# Patient Record
Sex: MALE | Race: WHITE | URBAN - METROPOLITAN AREA
[De-identification: names, ages, dates, MRNs, and addresses within clinical notes are randomized per-mention and may not be internally consistent; named-entity substitution may affect disease eponyms.]

---

## 2017-01-02 DIAGNOSIS — G89.29 CHRONIC NECK PAIN: Primary | ICD-10-CM

## 2017-01-02 DIAGNOSIS — F51.04 CHRONIC INSOMNIA: ICD-10-CM

## 2017-01-02 DIAGNOSIS — M54.2 CHRONIC NECK PAIN: Primary | ICD-10-CM

## 2017-01-02 NOTE — TELEPHONE ENCOUNTER
zolpidem (AMBIEN) 5 MG tablet      Last Written Prescription Date:  10/17/16  Last Fill Quantity: 90,   # refills: 0  Last Office Visit with [facility] or  Health prescribing provider: 03/23/16  Future Office visit:       Routing refill request to provider for review/approval because:  Drug not on the [facility] or  Health refill protocol or controlled substance.      gabapentin (NEURONTIN) 300 MG capsule      Last Written Prescription Date:  06/27/16  Last Office Visit with [facility] or [facility] prescribing provider: 03/23/16  Future Office visit:       Routing refill request to provider for review/approval because:  Drug not on the [facility] or  Health refill protocol or controlled substance  Medication is reported/historical

## 2017-01-03 ENCOUNTER — TELEPHONE (OUTPATIENT)
Dept: PEDIATRICS | Facility: CLINIC | Age: 67
End: 2017-01-03

## 2017-01-03 RX ORDER — ZOLPIDEM TARTRATE 5 MG/1
TABLET ORAL
Qty: 30 TABLET | Refills: 0 | Status: SHIPPED | OUTPATIENT
Start: 2017-01-03 | End: 2017-02-01

## 2017-01-03 RX ORDER — GABAPENTIN 300 MG/1
CAPSULE ORAL
Qty: 60 CAPSULE | Refills: 0 | Status: SHIPPED | OUTPATIENT
Start: 2017-01-03 | End: 2017-02-01
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
## 2017-01-03 NOTE — TELEPHONE ENCOUNTER
[facility] Call Center    Phone Message    Name of Caller: Madan    Phone Number: Home number on file [phone] (home)    Best time to return call: Any    May a detailed message be left on voicemail: yes    Relation to patient: Self    Reason for Call: Other: Patient is calling requesting an order be placed into his chart for him to have an injection in his neck to be done on an imaging department. Please call back to notify.      Action Taken: Message routed to:  Primary Care p [id number]

## 2017-01-03 NOTE — TELEPHONE ENCOUNTER
Patient is overdue for check up. I approved 30 days. Please assist patient schedule diabetes follow up and chronic disease review.     Fax ambien Rx to pt's pharmacy (if pt can wait until Thursday I will sign when I return)

## 2017-01-13 ENCOUNTER — OFFICE VISIT (OUTPATIENT)
Dept: PEDIATRICS | Facility: CLINIC | Age: 67
End: 2017-01-13
Payer: COMMERCIAL

## 2017-01-13 VITALS
DIASTOLIC BLOOD PRESSURE: 72 MMHG | SYSTOLIC BLOOD PRESSURE: 108 MMHG | TEMPERATURE: 97.5 F | OXYGEN SATURATION: 99 % | WEIGHT: 232 LBS | BODY MASS INDEX: 31.46 KG/M2 | HEART RATE: 82 BPM

## 2017-01-13 DIAGNOSIS — M54.12 CERVICAL RADICULOPATHY: Primary | ICD-10-CM

## 2017-01-13 DIAGNOSIS — Z11.59 NEED FOR HEPATITIS C SCREENING TEST: ICD-10-CM

## 2017-01-13 DIAGNOSIS — R73.09 ELEVATED GLUCOSE: ICD-10-CM

## 2017-01-13 DIAGNOSIS — Z11.59 NEED FOR HEPATITIS C SCREENING TEST: Primary | ICD-10-CM

## 2017-01-13 PROCEDURE — 99213 OFFICE O/P EST LOW 20 MIN: CPT | Performed by: INTERNAL MEDICINE
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
## 2017-01-13 NOTE — LETTER
January 29, 2018      Madan Gatica  [address]   [place] [address]              Dear Madan Gatica,      In order to ensure that we are providing the best quality care, we would like to remind you that you are due for fasting labs and a physical. Please let us know if you have any questions and we would be happy to help.     Thank you for trusting us with your care.    Sincerely,       [facility]  [phone]

## 2017-01-13 NOTE — MR AVS SNAPSHOT
After Visit Summary   2017    Madan Gatica    MRN: [id number]           Patient Information     Date Of Birth          1950        Visit Information        Provider Department      2017 2:40 PM Sydnee Mitchell MD [facility]        Today's Diagnoses     Cervical radiculopathy    -  1       Care Instructions    You can call [phone] to schedule for MRI.     [facility] Imaging:   [phone]  Fax: [phone]    [facility] Imaging:   Scheduling Line: [phone]  Clinic Phone: [phone]  Clinic Fax: [phone]        Follow-ups after your visit        Additional Services     ORTHO  REFERRAL       [facility]  Phone: [phone]          The  Representative will assist you in the coordination of your Orthopedic and Musculoskeletal Care as prescribed by your physician.    The  Representative will call you within 1 business day to help schedule your appointment, or you may contact the  Representative at:    All areas ~ [phone]     Type of Referral : Spine: Cervical / Thoracic: Cervical / Thoracic Spine Surgeon        Timeframe requested: Routine    Coverage of these services is subject to the terms and limitations of your health insurance plan.  Please call member services at your health plan with any benefit or coverage questions.      If X-rays, CT or MRI's have been performed, please contact the facility where they were done to arrange for , prior to your scheduled appointment.  Please bring this referral request to your appointment and present it to your specialist.                  Future tests that were ordered for you today     Open Future Orders        Priority Expected Expires Ordered    MR Cervical Spine w/o Contrast Routine  2018            Who to contact     If you have questions or need follow up information about today's clinic visit or your schedule please contact [facility] directly at [phone].  Normal or non-critical lab and imaging results will be communicated to you by Swishhart, letter or phone within 4 business days after the clinic has received the results. If you do not hear from us within 7 days, please contact the clinic through Swishhart or phone. If you have a critical or abnormal lab result, we will notify you by phone as soon as possible.  Submit refill requests through Lolay or call your pharmacy and they will forward the refill request to us. Please allow 3 business days for your refill to be completed.          Additional Information About Your Visit        Lolay Information     Lolay gives you secure access to your electronic health record. If you see a primary care provider, you can also send messages to your care team and make appointments. If you have questions, please call your primary care clinic.  If you do not have a primary care provider, please call [phone] and they will assist you.      Lolay is an electronic gateway that provides easy, online access to your medical records. With Lolay, you can request a clinic appointment, read your test results, renew a prescription or communicate with your care team.     To access your existing account, please contact your [facility] or call [phone] for assistance.        Care EveryWhere ID     This is your Care EveryWhere ID. This could be used by other organizations to access your [place] medical records  [id number]        Your Vitals Were     Pulse Temperature Pulse Oximetry             82 97.5  F (36.4  C) (Temporal) 99%          Blood Pressure from Last 3 Encounters:   01/13/17 108/72   05/18/16 120/83   03/23/16 121/81    Weight from Last 3 Encounters:   01/13/17 232 lb (105.235 kg)   05/18/16 231 lb (104.781 kg)   03/23/16 236 lb 8 oz (107.276 kg)              We Performed the Following     ORTHO  REFERRAL        Primary Care  Provider    [facility]       No address on file        Thank you!     Thank you for choosing [facility]  for your care. Our goal is always to provide you with excellent care. Hearing back from our patients is one way we can continue to improve our services. Please take a few minutes to complete the written survey that you may receive in the mail after your visit with us. Thank you!             Your Updated Medication List - Protect others around you: Learn how to safely use, store and throw away your medicines at www.disposemymeds.org.          This list is accurate as of: 1/13/17  2:52 PM.  Always use your most recent med list.                   Brand Name Dispense Instructions for use    ASPIRIN PO      Take 81 mg by mouth       blood glucose lancets standard    no brand specified     two times a day. Use as directed. Pharmacy dispense brand based on insurance.       blood glucose monitoring test strip    no brand specified     USE ONE STRIP TO CHECK GLUCOSE TWICE DAILY AS DIRECTED       cyclobenzaprine 10 MG tablet    FLEXERIL         gabapentin 300 MG capsule    NEURONTIN    60 capsule    TAKE TWO CAPSULES BY MOUTH AT BEDTIME       sildenafil 100 MG cap/tab    VIAGRA    6 tablet    Take 0.5-1 tablets ( mg) by mouth daily as needed for erectile dysfunction Take 30 min to 4 hours before intercourse.  Never use with nitroglycerin, terazosin or doxazosin.       zolpidem 5 MG tablet    AMBIEN    30 tablet    TAKE ONE TABLET BY MOUTH AT BEDTIME AS NEEDED

## 2017-01-13 NOTE — PATIENT INSTRUCTIONS
You can call [phone] to schedule for MRI.     Schedule for annual wellness and fasting lab appointment in February.         [facility] Imaging:   [phone]  Fax: [phone]    [facility] Imaging:   Scheduling Line: [phone]  Clinic Phone: [phone]  Clinic Fax: [phone]

## 2017-01-20 ENCOUNTER — RADIANT APPOINTMENT (OUTPATIENT)
Dept: MRI IMAGING | Facility: CLINIC | Age: 67
End: 2017-01-20
Attending: INTERNAL MEDICINE
Payer: COMMERCIAL

## 2017-01-20 DIAGNOSIS — M54.12 CERVICAL RADICULOPATHY: ICD-10-CM

## 2017-01-20 PROCEDURE — 72141 MRI NECK SPINE W/O DYE: CPT | Performed by: RADIOLOGY

## 2017-01-23 ENCOUNTER — TELEPHONE (OUTPATIENT)
Dept: PEDIATRICS | Facility: CLINIC | Age: 67
End: 2017-01-23

## 2017-01-23 NOTE — TELEPHONE ENCOUNTER
[facility] Call Center    Phone Message    Name of Caller: Madan    Phone Number: Cell number on file:    Telephone Information:   Mobile [phone]       Best time to return call: ANY    May a detailed message be left on voicemail: yes    Relation to patient: Self    Reason for Call: Requesting Results   Name/type of test: MRI  Date of test: 01/20/17  Was test done at a location other than [facility] (Please fill in the location if not [facility])?: No      Action Taken: Message routed to:  Primary Care p [id number]

## 2017-01-24 NOTE — TELEPHONE ENCOUNTER
Let patient know that MRI showed the following. Moderate to severe narrowing of the nerve root exit area at C4-5 and C5-6. These may be contributing to his symptoms. I recommend he sees a spine surgeon to evaluate. I gave him a referral to [facility] at his clinic visit. Pt should request a copy of the MRI on CD and bring it to the appointment.     Impression: Multilevel cervical spondylosis and spondylolisthesis.  Moderate to advanced bilateral neural foraminal stenosis at C4-C5 and  C5-C6. Mild central canal stenosis C2-C7. No cord signal abnormality.

## 2017-01-24 NOTE — TELEPHONE ENCOUNTER
Call back placed to patient to review Dr. Mitchell's recommendations related to MRI results, as noted below.  This writer reviewed with patient.  Patient verbalized understanding and will make an appointment with [facility] for further evaluation.  Encouraged patient to call with any questions or concerns if he runs into issues with the referral process to [facility].      Patient verbalized understanding and agreeable to plan of care.      Sydnee Mitchell MD at 1/23/2017 10:18 PM      Status: Signed         Expand All Collapse All    Let patient know that MRI showed the following. Moderate to severe narrowing of the nerve root exit area at C4-5 and C5-6. These may be contributing to his symptoms. I recommend he sees a spine surgeon to evaluate. I gave him a referral to [facility] at his clinic visit. Pt should request a copy of the MRI on CD and bring it to the appointment.     Impression: Multilevel cervical spondylosis and spondylolisthesis.  Moderate to advanced bilateral neural foraminal stenosis at C4-C5 and  C5-C6. Mild central canal stenosis C2-C7. No cord signal abnormality.

## 2017-01-25 ENCOUNTER — TELEPHONE (OUTPATIENT)
Dept: PEDIATRICS | Facility: CLINIC | Age: 67
End: 2017-01-25

## 2017-01-25 NOTE — TELEPHONE ENCOUNTER
[facility] Call Center    Phone Message    Name of Caller: Madan    Phone Number: Cell number on file:    Telephone Information:   Mobile [phone]       Best time to return call: any    May a detailed message be left on voicemail: yes    Relation to patient: Self    Reason for Call: Order(s): Other:   What is being requested: Surgery orders to be faxed to [facility]  Reason for requested: [facility] requesting surgery orders be faxed to them at [phone]  Date needed: ASAP  Provider name: Dr. Mitchell      Action Taken: Message routed to:  Primary Care p [phone]

## 2017-01-25 NOTE — TELEPHONE ENCOUNTER
Spoke to patient, he is requesting the ortho referral be sent to [facility].     Referral faxed to [phone].    Dedra Childress RN, AE-C

## 2017-02-01 DIAGNOSIS — G89.29 CHRONIC NECK PAIN: ICD-10-CM

## 2017-02-01 DIAGNOSIS — F51.04 CHRONIC INSOMNIA: Primary | ICD-10-CM

## 2017-02-01 DIAGNOSIS — M54.2 CHRONIC NECK PAIN: ICD-10-CM

## 2017-02-02 RX ORDER — ZOLPIDEM TARTRATE 5 MG/1
TABLET ORAL
Qty: 90 TABLET | Refills: 0 | Status: SHIPPED | OUTPATIENT
Start: 2017-02-02 | End: 2017-05-08

## 2017-02-02 RX ORDER — GABAPENTIN 300 MG/1
CAPSULE ORAL
Qty: 60 CAPSULE | Refills: 0 | Status: SHIPPED | OUTPATIENT
Start: 2017-02-02 | End: 2017-02-13

## 2017-02-02 NOTE — TELEPHONE ENCOUNTER
gabapentin (NEURONTIN) 300 MG capsule      Last Written Prescription Date:  01/03/17  Last Fill Quantity: 60,   # refills: 0  Last Office Visit with [facility] or  Health prescribing provider: 01/13/17.  Future Office visit:       Routing refill request to provider for review/approval because:  Drug not on the [facility], [facility] or [facility] refill protocol or controlled substance.    Per pharmacy fax notation, patient is requesting a 90-day supply.      zolpidem (AMBIEN) 5 MG tablet      Last Written Prescription Date:  01/03/17  Last Fill Quantity: 30,   # refills: 0  Last Office Visit with [facility], [facility] or [facility] prescribing provider: 01/13/17.  Future Office visit:       Routing refill request to provider for review/approval because:  Drug not on the [facility], [facility] or [facility] refill protocol or controlled substance

## 2017-02-13 DIAGNOSIS — G89.29 CHRONIC NECK PAIN: ICD-10-CM

## 2017-02-13 DIAGNOSIS — M54.2 CHRONIC NECK PAIN: ICD-10-CM

## 2017-02-13 RX ORDER — GABAPENTIN 300 MG/1
300 CAPSULE ORAL 3 TIMES DAILY
Qty: 90 CAPSULE | Refills: 1 | Status: SHIPPED | OUTPATIENT
Start: 2017-02-13 | End: 2017-05-12

## 2017-02-13 NOTE — TELEPHONE ENCOUNTER
gabapentin (NEURONTIN) 300 MG capsule      Last Written Prescription Date: 2/2/17 - Per pharmacy note patient requesting 90 day supply  Last Quantity: 60, # refills: 0  Last Office Visit with [facility] or [facility] prescribing provider: 1/13/17       Creatinine   Date Value Ref Range Status   12/03/2015 1.09 0.66 - 1.25 mg/dL Final     Lab Results   Component Value Date    AST 27 12/03/2015     Lab Results   Component Value Date    ALT 38 12/03/2015     BP Readings from Last 3 Encounters:   01/13/17 108/72   05/18/16 120/83   03/23/16 121/81

## 2017-05-08 DIAGNOSIS — F51.04 CHRONIC INSOMNIA: ICD-10-CM

## 2017-05-08 RX ORDER — ZOLPIDEM TARTRATE 5 MG/1
TABLET ORAL
Qty: 90 TABLET | Refills: 1 | Status: SHIPPED | OUTPATIENT
Start: 2017-05-08 | End: 2017-12-15

## 2017-05-08 NOTE — TELEPHONE ENCOUNTER
[facility] Call Center    Phone Message    Name of Caller: Madan    Phone Number: Home number on file [phone] (home)    Best time to return call: Any    May a detailed message be left on voicemail: yes    Relation to patient: Self    Reason for Call: Medication Refill Request    Has the patient contacted the pharmacy for the refill? Yes   Name of medication being requested: zolpidem (AMBIEN) 5 MG tablet [64100] (Order [id number])     Provider who prescribed the medication: Dr. Mitchell  Pharmacy:   Date medication is needed: ASAP         Action Taken: Message routed to:  Primary Care p [phone]

## 2017-05-08 NOTE — TELEPHONE ENCOUNTER
Routing refill request to provider for review/approval because:  Drug not on the [facility] refill protocol       Routing high priority as patient is out of medication.          zolpidem (AMBIEN) 5 MG tablet 90 tablet 0 2/2/2017  No      Sig: TAKE ONE TABLET BY MOUTH AT BEDTIME AS NEEDED     Tatiana Tee RN, [facility] [[email or web address]]

## 2017-05-09 NOTE — TELEPHONE ENCOUNTER
Patient advised script for ambien faxed to [facility].    Jania Winter CMA

## 2017-05-12 ENCOUNTER — OFFICE VISIT (OUTPATIENT)
Dept: PEDIATRICS | Facility: CLINIC | Age: 67
End: 2017-05-12
Payer: COMMERCIAL

## 2017-05-12 VITALS
OXYGEN SATURATION: 99 % | DIASTOLIC BLOOD PRESSURE: 80 MMHG | WEIGHT: 235 LBS | HEART RATE: 89 BPM | BODY MASS INDEX: 31.87 KG/M2 | TEMPERATURE: 97.9 F | SYSTOLIC BLOOD PRESSURE: 124 MMHG

## 2017-05-12 DIAGNOSIS — K80.20 CALCULUS OF GALLBLADDER WITHOUT CHOLECYSTITIS WITHOUT OBSTRUCTION: Primary | ICD-10-CM

## 2017-05-12 DIAGNOSIS — K70.0 FATTY LIVER, ALCOHOLIC: ICD-10-CM

## 2017-05-12 PROCEDURE — 99213 OFFICE O/P EST LOW 20 MIN: CPT | Performed by: INTERNAL MEDICINE

## 2017-05-12 NOTE — MR AVS SNAPSHOT
After Visit Summary   5/12/2017    Madan Gatica    MRN: [id number]           Patient Information     Date Of Birth          1950        Visit Information        Provider Department      5/12/2017 1:40 PM Sydnee Mitchell MD [facility]        Today's Diagnoses     Calculus of gallbladder without cholecystitis without obstruction    -  1    Fatty liver, alcoholic          Care Instructions    Make appointment(s) for:   -- see general surgeon to evaluate for gallbladder surgery.             Follow-ups after your visit        Additional Services     GENERAL SURG ADULT REFERRAL       Your provider has referred you to: [facility]: [facility] [phone]   [email or web address]    Please be aware that coverage of these services is subject to the terms and limitations of your health insurance plan.  Call member services at your health plan with any benefit or coverage questions.      Please bring the following with you to your appointment:    (1) Any X-Rays, CTs or MRIs which have been performed.  Contact the facility where they were done to arrange for  prior to your scheduled appointment.   (2) List of current medications   (3) This referral request   (4) Any documents/labs given to you for this referral                  Who to contact     If you have questions or need follow up information about today's clinic visit or your schedule please contact [facility] directly at [phone].  Normal or non-critical lab and imaging results will be communicated to you by MyChart, letter or phone within 4 business days after the clinic has received the results. If you do not hear from us within 7 days, please contact the clinic through MyChart or phone. If you have a critical or abnormal lab result, we will notify you by phone as soon as possible.  Submit refill requests through YaKlasst or  call your pharmacy and they will forward the refill request to us. Please allow 3 business days for your refill to be completed.          Additional Information About Your Visit        [employer] Information     2Checkout gives you secure access to your electronic health record. If you see a primary care provider, you can also send messages to your care team and make appointments. If you have questions, please call your primary care clinic.  If you do not have a primary care provider, please call [phone] and they will assist you.      2Checkout is an electronic gateway that provides easy, online access to your medical records. With 2Checkout, you can request a clinic appointment, read your test results, renew a prescription or communicate with your care team.     To access your existing account, please contact your [facility] or call [phone] for assistance.        Care EveryWhere ID     This is your Care EveryWhere ID. This could be used by other organizations to access your [place] medical records  [id number]        Your Vitals Were     Pulse Temperature Pulse Oximetry BMI (Body Mass Index)          89 97.9  F (36.6  C) (Temporal) 99% 31.87 kg/m2         Blood Pressure from Last 3 Encounters:   05/12/17 124/80   01/13/17 108/72   05/18/16 120/83    Weight from Last 3 Encounters:   05/12/17 235 lb (106.6 kg)   01/13/17 232 lb (105.2 kg)   05/18/16 231 lb (104.8 kg)              We Performed the Following     GENERAL SURG ADULT REFERRAL        Primary Care Provider Office Phone # Fax #    Sydnee Mitchell [phone]       [facility] [address]  [place] [address]        Thank you!     Thank you for choosing [facility]  for your care. Our goal is always to provide you with excellent care. Hearing back from our patients is one way we can continue to improve our services. Please take a few minutes to complete the written survey that you  may receive in the mail after your visit with us. Thank you!             Your Updated Medication List - Protect others around you: Learn how to safely use, store and throw away your medicines at www.disposemymeds.org.          This list is accurate as of: 5/12/17  2:16 PM.  Always use your most recent med list.                   Brand Name Dispense Instructions for use    ASPIRIN PO      Take 81 mg by mouth       blood glucose lancets standard    no brand specified     two times a day. Use as directed. Pharmacy dispense brand based on insurance.       blood glucose monitoring test strip    no brand specified     USE ONE STRIP TO CHECK GLUCOSE TWICE DAILY AS DIRECTED       cyclobenzaprine 10 MG tablet    FLEXERIL         sildenafil 100 MG cap/tab    VIAGRA    6 tablet    Take 0.5-1 tablets ( mg) by mouth daily as needed for erectile dysfunction Take 30 min to 4 hours before intercourse.  Never use with nitroglycerin, terazosin or doxazosin.       zolpidem 5 MG tablet    AMBIEN    90 tablet    TAKE ONE TABLET BY MOUTH ONCE DAILY AT BEDTIME AS NEEDED

## 2017-05-12 NOTE — NURSING NOTE
Chief Complaint   Patient presents with     Hospital F/U       Initial /80 (Cuff Size: Adult Large)  Pulse 89  Temp 97.9  F (36.6  C) (Temporal)  Wt 235 lb (106.6 kg)  SpO2 99%  BMI 31.87 kg/m2 Estimated body mass index is 31.87 kg/(m^2) as calculated from the following:    Height as of 5/18/16: 6' (1.829 m).    Weight as of this encounter: 235 lb (106.6 kg).  Medication Reconciliation: complete

## 2017-05-12 NOTE — PROGRESS NOTES
SUBJECTIVE:                                                    Madan Gatica is a 66 year old male who presents to clinic today for the following health issues:      ED/UC Followup:    Facility:  [facility]  Date of visit: 5-9-17  Reason for visit: Gallstones  Current Status: improved, but still pain under breast bone       Patient presented to [facility] with acute right-sided lower chest pain and left upper quadrant abdominal pain. He had chest x-ray and ultrasound of the abdomen. Chest x-ray was negative for pneumonia, pleural effusion or pneumothorax. Abdominal ultrasound showed fatty liver disease and no stones. No bile duct dilatation. The remaining of the ultrasound was normal. Patient reports the pain is subsided significantly. His back to work now. However he continues to have mild pain under the breast bone. Patient drinks alcohol 2-3 beers on a daily basis. I discussed with patient that this may be contributing to the fatty liver disease.         Problem list, Medication list, Allergies, and Medical/Social/Surgical histories reviewed in EPIC and updated as appropriate.    OBJECTIVE:                                                    /80 (Cuff Size: Adult Large)  Pulse 89  Temp 97.9  F (36.6  C) (Temporal)  Wt 235 lb (106.6 kg)  SpO2 99%  BMI 31.87 kg/m2    GEN: healthy, alert and no distress  Abd: soft, normal active bowel sounds, RUQ mildly tender, non distended. No mass or organomegaly.           Diagnostic test results:  No results found for this or any previous visit (from the past 24 hour(s)).       ASSESSMENT/PLAN:                                                      66 year old male with the following diagnoses and treatment plan:      ICD-10-CM    1. Calculus of gallbladder without cholecystitis without obstruction K80.20 GENERAL SURG ADULT REFERRAL   2. Fatty liver, alcoholic K70.0        -- We'll refer to general surgeon for consideration of cholecystectomy, low-fat diet.  Also discuss cutting down on alcohol use.    Sydnee Mitchell MD-PhD  [facility]    (Note: Chart documentation was done in part with Dragon Voice Recognition software. Although reviewed after completion, some word and grammatical errors may remain.)

## 2017-05-15 ENCOUNTER — OFFICE VISIT (OUTPATIENT)
Dept: SURGERY | Facility: CLINIC | Age: 67
End: 2017-05-15
Attending: INTERNAL MEDICINE
Payer: COMMERCIAL

## 2017-05-15 VITALS
BODY MASS INDEX: 31.97 KG/M2 | WEIGHT: 236 LBS | SYSTOLIC BLOOD PRESSURE: 135 MMHG | HEART RATE: 95 BPM | OXYGEN SATURATION: 96 % | HEIGHT: 72 IN | DIASTOLIC BLOOD PRESSURE: 87 MMHG

## 2017-05-15 DIAGNOSIS — R10.11 RUQ PAIN: Primary | ICD-10-CM

## 2017-05-15 PROCEDURE — 99204 OFFICE O/P NEW MOD 45 MIN: CPT | Performed by: SURGERY

## 2017-05-15 NOTE — PROGRESS NOTES
"Chief Complaint: RUQ pain    History of Present Illness:   66 year old male sent in consultation by Sydnee Mitchell MD for evaluation of RUQ pain. He first noted some acute RUQ pain on 5/9/17. He had been recovering from a URTI and noted right lower chest wall pain following movement of his torso. He then noted constant pain in that region which he describes as feeling like it is under his \"breast bone\" and feels like a dull pressure without any radiation. He denies any radiation of the symptoms. He notes exacerbation with activity and movement and improvement with rest. He denies any association with food intake and denies any associated nausea or vomiting. He now notes that the pain has significantly improved, but since occurs 1-2 times/hour, lasting about 2 minutes.       No past medical history on file.  Past Surgical History:   Procedure Laterality Date     LASIK BILATERAL  2004     REMOVE HARDWARE ARTHROPLASTY KNEE, IRRIG & ROMEO, PLACE ANTIBIOTIC CEMENT SPACER, COMBINED Right 2008     Current Outpatient Prescriptions   Medication     zolpidem (AMBIEN) 5 MG tablet     sildenafil (VIAGRA) 100 MG tablet     blood glucose monitoring (NO BRAND SPECIFIED) test strip     blood glucose (NO BRAND SPECIFIED) lancets standard     cyclobenzaprine (FLEXERIL) 10 MG tablet     ASPIRIN PO     No current facility-administered medications for this visit.         Allergies   Allergen Reactions     Penicillins      Social History     Social History     Marital status:      Spouse name: N/A     Number of children: N/A     Years of education: N/A     Occupational History     Not on file.     Social History Main Topics     Smoking status: Former Smoker     Packs/day: 1.00     Years: 4.00     Types: Cigarettes     Smokeless tobacco: Never Used     Alcohol use 12.6 oz/week     21 Standard drinks or equivalent per week      Comment: 3 beer a day     Drug use: No     Sexual activity: Yes     Partners: Female     Other Topics Concern " "    Not on file     Social History Narrative     Family History   Problem Relation Age of Onset     HEART DISEASE Brother      bicuspid aortic valve     Liver Disease Brother      hemachromatosis           Review of Systems:  No chest pain, dyspnea,  fevers or night sweats. Intentional weight loss of ~ 30 lbs over the last 30 years   All other systems questioned and negative.     Exam:  Vital signs for exam: /87  Pulse 95  Ht 1.816 m (5' 11.5\")  Wt 107 kg (236 lb)  SpO2 96%  BMI 32.46 kg/m2  Constitutional: healthy, alert and no distress.  Head: Normocephalic. No masses, lesions, tenderness or abnormalities.  ENT: ENT exam normal, no neck nodes or sinus tenderness.  Cardiovascular: Normal S1, S2, no murmurs  Respiratory: Clear to auscultation.   Gastrointestinal:  Abdomen soft,No masses, organomegaly. Mild tenderness just below and along the costal margin on the right side  : Deferred  Musculoskeletal: extremities normal- no gross deformities noted and normal muscle tone  Skin: no jaundice, rashes or petechiae.   Neurologic: Gait normal.  Psychiatric: Mentation appears normal and affect normal.    Laboratory Studies:  Normal CBC, BMP and LFTs on 5/9/17    ECG: new right bundle branch block      Radiology:   US 5/9/17: There are gallstones seen layering in the dependent portion of the gallbladder. No associated gallbladder wall thickening. No biliary dilatation. The common duct measures 3 mm. The liver demonstrates some increased echogenicity probably reflecting fatty infiltration. Pancreas, spleen and kidneys appear unremarkable.    Chest x-ray:   The lungs are clear bilaterally.  The heart and mediastinal contours are normal.  No pleural effusion. There is no pneumothorax. Degenerative changes in the thoracic spine.    IMPRESSION AND PLAN:  RUQ with cholelithiasis on US. His symptoms do not sound classic for symptomatic cholelithiasis, and sound more musculoskeletal. I recommend a trial of anti " inflammatory medications. He will call me if the symptoms do not resolve, and we will consider a CCK HIDA.

## 2017-05-15 NOTE — MR AVS SNAPSHOT
After Visit Summary   5/15/2017    Madan Gatica    MRN: [id number]           Patient Information     Date Of Birth          1950        Visit Information        Provider Department      5/15/2017 11:30 AM Karey Schilling MD [facility]        Today's Diagnoses     RUQ pain    -  1       Follow-ups after your visit        Who to contact     If you have questions or need follow up information about today's clinic visit or your schedule please contact [facility] directly at [phone].  Normal or non-critical lab and imaging results will be communicated to you by Wuiperhart, letter or phone within 4 business days after the clinic has received the results. If you do not hear from us within 7 days, please contact the clinic through Wuiperhart or phone. If you have a critical or abnormal lab result, we will notify you by phone as soon as possible.  Submit refill requests through GlobalLogic or call your pharmacy and they will forward the refill request to us. Please allow 3 business days for your refill to be completed.          Additional Information About Your Visit        MyChart Information     GlobalLogic gives you secure access to your electronic health record. If you see a primary care provider, you can also send messages to your care team and make appointments. If you have questions, please call your primary care clinic.  If you do not have a primary care provider, please call [phone] and they will assist you.      GlobalLogic is an electronic gateway that provides easy, online access to your medical records. With GlobalLogic, you can request a clinic appointment, read your test results, renew a prescription or communicate with your care team.     To access your existing account, please contact your [facility] or call [phone] for assistance.        Care EveryWhere ID     This is your Care EveryWhere ID. This could be used by other  "organizations to access your [facility] medical records  [id number]        Your Vitals Were     Pulse Height Pulse Oximetry BMI (Body Mass Index)          95 1.816 m (5' 11.5\") 96% 32.46 kg/m2         Blood Pressure from Last 3 Encounters:   05/15/17 135/87   05/12/17 124/80   01/13/17 108/72    Weight from Last 3 Encounters:   05/15/17 107 kg (236 lb)   05/12/17 106.6 kg (235 lb)   01/13/17 105.2 kg (232 lb)              Today, you had the following     No orders found for display       Primary Care Provider Office Phone # Fax #    Sydnee Mitchell [phone]       [facility] [address] [facility] [address]        Thank you!     Thank you for choosing [facility]  for your care. Our goal is always to provide you with excellent care. Hearing back from our patients is one way we can continue to improve our services. Please take a few minutes to complete the written survey that you may receive in the mail after your visit with us. Thank you!             Your Updated Medication List - Protect others around you: Learn how to safely use, store and throw away your medicines at www.disposemymeds.org.          This list is accurate as of: 5/15/17 12:09 PM.  Always use your most recent med list.                   Brand Name Dispense Instructions for use    ASPIRIN PO      Take 81 mg by mouth       cyclobenzaprine 10 MG tablet    FLEXERIL         zolpidem 5 MG tablet    AMBIEN    90 tablet    TAKE ONE TABLET BY MOUTH ONCE DAILY AT BEDTIME AS NEEDED         "

## 2017-06-17 ENCOUNTER — TELEPHONE (OUTPATIENT)
Dept: PEDIATRICS | Facility: CLINIC | Age: 67
End: 2017-06-17

## 2017-12-15 DIAGNOSIS — F51.04 CHRONIC INSOMNIA: ICD-10-CM

## 2017-12-15 RX ORDER — ZOLPIDEM TARTRATE 5 MG/1
TABLET ORAL
Qty: 90 TABLET | Refills: 1 | Status: SHIPPED | OUTPATIENT
Start: 2017-12-15 | End: 2018-06-23

## 2017-12-15 NOTE — TELEPHONE ENCOUNTER
Routing refill request to provider for review/approval because:  Drug not on the [facility] refill protocol    Disp Refills Start End ANKIT      zolpidem (AMBIEN) 5 MG tablet 90 tablet 1 5/8/2017  No     Sig: TAKE ONE TABLET BY MOUTH ONCE DAILY AT BEDTIME AS NEEDED     Last office visit:  5/12/17          Faith Li RN,   [facility], [facility]

## 2018-03-29 ENCOUNTER — DOCUMENTATION ONLY (OUTPATIENT)
Dept: LAB | Facility: CLINIC | Age: 68
End: 2018-03-29

## 2018-03-29 DIAGNOSIS — Z00.00 ROUTINE GENERAL MEDICAL EXAMINATION AT A HEALTH CARE FACILITY: Primary | ICD-10-CM

## 2018-03-29 NOTE — PROGRESS NOTES
Patient wants yearly physical labs done on 4/2/18.    Patient has a pre-op with Dr. Mora on 4/2/18.  In the appointment notes: (doing yearly Ho labs, if any part of CHANTEL can be done, pt requesting- pt informed usually different appt)      Lipids completed 12/3/15.  Due every 5 years.    Lab Results   Component Value Date    CHOL 117 12/03/2015     Lab Results   Component Value Date    HDL 45 12/03/2015     Lab Results   Component Value Date    LDL 48 12/03/2015     Lab Results   Component Value Date    TRIG 118 12/03/2015             Faith Li RN,   [facility], [facility]

## 2018-04-02 ENCOUNTER — TELEPHONE (OUTPATIENT)
Dept: PEDIATRICS | Facility: CLINIC | Age: 68
End: 2018-04-02

## 2018-04-02 ENCOUNTER — OFFICE VISIT (OUTPATIENT)
Dept: PEDIATRICS | Facility: CLINIC | Age: 68
End: 2018-04-02
Payer: COMMERCIAL

## 2018-04-02 VITALS
BODY MASS INDEX: 32.06 KG/M2 | SYSTOLIC BLOOD PRESSURE: 122 MMHG | TEMPERATURE: 96.2 F | HEART RATE: 81 BPM | DIASTOLIC BLOOD PRESSURE: 80 MMHG | WEIGHT: 229 LBS | OXYGEN SATURATION: 100 % | HEIGHT: 71 IN

## 2018-04-02 DIAGNOSIS — M50.30 DDD (DEGENERATIVE DISC DISEASE), CERVICAL: ICD-10-CM

## 2018-04-02 DIAGNOSIS — Z00.00 ROUTINE GENERAL MEDICAL EXAMINATION AT A HEALTH CARE FACILITY: Primary | ICD-10-CM

## 2018-04-02 DIAGNOSIS — Z11.59 NEED FOR HEPATITIS C SCREENING TEST: ICD-10-CM

## 2018-04-02 DIAGNOSIS — Z01.818 PREOP GENERAL PHYSICAL EXAM: Primary | ICD-10-CM

## 2018-04-02 LAB
ALBUMIN SERPL-MCNC: 4.1 G/DL (ref 3.4–5)
ALBUMIN UR-MCNC: NEGATIVE MG/DL
ALP SERPL-CCNC: 82 U/L (ref 40–150)
ALT SERPL W P-5'-P-CCNC: 29 U/L (ref 0–70)
ANION GAP SERPL CALCULATED.3IONS-SCNC: 6 MMOL/L (ref 3–14)
APPEARANCE UR: CLEAR
AST SERPL W P-5'-P-CCNC: 23 U/L (ref 0–45)
BASOPHILS # BLD AUTO: 0.1 10E9/L (ref 0–0.2)
BASOPHILS NFR BLD AUTO: 0.9 %
BILIRUB SERPL-MCNC: 0.6 MG/DL (ref 0.2–1.3)
BILIRUB UR QL STRIP: NEGATIVE
BUN SERPL-MCNC: 12 MG/DL (ref 7–30)
CALCIUM SERPL-MCNC: 8.8 MG/DL (ref 8.5–10.1)
CHLORIDE SERPL-SCNC: 107 MMOL/L (ref 94–109)
CO2 SERPL-SCNC: 28 MMOL/L (ref 20–32)
COLOR UR AUTO: YELLOW
CREAT SERPL-MCNC: 0.94 MG/DL (ref 0.66–1.25)
DIFFERENTIAL METHOD BLD: ABNORMAL
EOSINOPHIL # BLD AUTO: 0.1 10E9/L (ref 0–0.7)
EOSINOPHIL NFR BLD AUTO: 1.4 %
ERYTHROCYTE [DISTWIDTH] IN BLOOD BY AUTOMATED COUNT: 11.9 % (ref 10–15)
GFR SERPL CREATININE-BSD FRML MDRD: 80 ML/MIN/1.7M2
GLUCOSE SERPL-MCNC: 122 MG/DL (ref 70–99)
GLUCOSE UR STRIP-MCNC: NEGATIVE MG/DL
HCT VFR BLD AUTO: 43.9 % (ref 40–53)
HGB BLD-MCNC: 14.7 G/DL (ref 13.3–17.7)
HGB UR QL STRIP: NEGATIVE
IMM GRANULOCYTES # BLD: 0 10E9/L (ref 0–0.4)
IMM GRANULOCYTES NFR BLD: 0.4 %
KETONES UR STRIP-MCNC: NEGATIVE MG/DL
LEUKOCYTE ESTERASE UR QL STRIP: NEGATIVE
LYMPHOCYTES # BLD AUTO: 0.9 10E9/L (ref 0.8–5.3)
LYMPHOCYTES NFR BLD AUTO: 16.4 %
MCH RBC QN AUTO: 34 PG (ref 26.5–33)
MCHC RBC AUTO-ENTMCNC: 33.5 G/DL (ref 31.5–36.5)
MCV RBC AUTO: 102 FL (ref 78–100)
MONOCYTES # BLD AUTO: 0.6 10E9/L (ref 0–1.3)
MONOCYTES NFR BLD AUTO: 10 %
NEUTROPHILS # BLD AUTO: 4 10E9/L (ref 1.6–8.3)
NEUTROPHILS NFR BLD AUTO: 70.9 %
NITRATE UR QL: NEGATIVE
PH UR STRIP: 5 PH (ref 5–7)
PLATELET # BLD AUTO: 225 10E9/L (ref 150–450)
POTASSIUM SERPL-SCNC: 4.3 MMOL/L (ref 3.4–5.3)
PROT SERPL-MCNC: 7.7 G/DL (ref 6.8–8.8)
RBC # BLD AUTO: 4.32 10E12/L (ref 4.4–5.9)
SODIUM SERPL-SCNC: 141 MMOL/L (ref 133–144)
SOURCE: NORMAL
SP GR UR STRIP: 1.01 (ref 1–1.03)
UROBILINOGEN UR STRIP-MCNC: NORMAL MG/DL (ref 0–2)
WBC # BLD AUTO: 5.6 10E9/L (ref 4–11)

## 2018-04-02 PROCEDURE — 80053 COMPREHEN METABOLIC PANEL: CPT | Performed by: FAMILY MEDICINE

## 2018-04-02 PROCEDURE — 85025 COMPLETE CBC W/AUTO DIFF WBC: CPT | Performed by: FAMILY MEDICINE

## 2018-04-02 PROCEDURE — 99214 OFFICE O/P EST MOD 30 MIN: CPT | Performed by: FAMILY MEDICINE

## 2018-04-02 PROCEDURE — 81003 URINALYSIS AUTO W/O SCOPE: CPT | Performed by: FAMILY MEDICINE

## 2018-04-02 PROCEDURE — 93000 ELECTROCARDIOGRAM COMPLETE: CPT | Performed by: FAMILY MEDICINE

## 2018-04-02 PROCEDURE — 36415 COLL VENOUS BLD VENIPUNCTURE: CPT | Performed by: FAMILY MEDICINE

## 2018-04-02 ASSESSMENT — PAIN SCALES - GENERAL: PAINLEVEL: NO PAIN (0)

## 2018-04-02 NOTE — MR AVS SNAPSHOT
After Visit Summary   4/2/2018    Madan Gatica    MRN: [id number]           Patient Information     Date Of Birth          1950        Visit Information        Provider Department      4/2/2018 9:10 AM Tj Mora MD [facility]        Today's Diagnoses     Preop general physical exam    -  1    DDD (degenerative disc disease), cervical        Need for hepatitis C screening test          Care Instructions      Before Your Surgery      Call your surgeon if there is any change in your health. This includes signs of a cold or flu (such as a sore throat, runny nose, cough, rash or fever).    Do not smoke, drink alcohol or take over the counter medicine (unless your surgeon or primary care doctor tells you to) for the 24 hours before and after surgery.    If you take prescribed drugs: Follow your doctor s orders about which medicines to take and which to stop until after surgery.    Eating and drinking prior to surgery: follow the instructions from your surgeon    Take a shower or bath the night before surgery. Use the soap your surgeon gave you to gently clean your skin. If you do not have soap from your surgeon, use your regular soap. Do not shave or scrub the surgery site.  Wear clean pajamas and have clean sheets on your bed.           Follow-ups after your visit        Future tests that were ordered for you today     Open Future Orders        Priority Expected Expires Ordered    XR Chest 2 Views STAT 4/2/2018 4/2/2019 4/2/2018    Hepatitis C Screen Reflex to HCV RNA Quant and Genotype Routine 5/2/2018 4/2/2019 4/2/2018            Who to contact     If you have questions or need follow up information about today's clinic visit or your schedule please contact [facility] directly at [phone].  Normal or non-critical lab and imaging results will be communicated to you by MyChart, letter or phone within 4 business days after the clinic has received the  "results. If you do not hear from us within 7 days, please contact the clinic through Avtodoria or phone. If you have a critical or abnormal lab result, we will notify you by phone as soon as possible.  Submit refill requests through Avtodoria or call your pharmacy and they will forward the refill request to us. Please allow 3 business days for your refill to be completed.          Additional Information About Your Visit        ALLO CommunicationsharVoteIt Information     Avtodoria gives you secure access to your electronic health record. If you see a primary care provider, you can also send messages to your care team and make appointments. If you have questions, please call your primary care clinic.  If you do not have a primary care provider, please call [phone] and they will assist you.      Avtodoria is an electronic gateway that provides easy, online access to your medical records. With Avtodoria, you can request a clinic appointment, read your test results, renew a prescription or communicate with your care team.     To access your existing account, please contact your [facility] or call [phone] for assistance.        Care EveryWhere ID     This is your Care EveryWhere ID. This could be used by other organizations to access your [facility] medical records  [id number]        Your Vitals Were     Pulse Temperature Height Pulse Oximetry BMI (Body Mass Index)       81 96.2  F (35.7  C) (Temporal) 5' 10.67\" (1.795 m) 100% 32.24 kg/m2        Blood Pressure from Last 3 Encounters:   04/02/18 122/80   05/15/17 135/87   05/12/17 124/80    Weight from Last 3 Encounters:   04/02/18 229 lb (103.9 kg)   05/15/17 236 lb (107 kg)   05/12/17 235 lb (106.6 kg)              We Performed the Following     CBC with platelets and differential     Comprehensive metabolic panel (BMP + Alb, Alk Phos, ALT, AST, Total. Bili, TP)     EKG 12-lead complete w/read - Clinics     UA reflex to Microscopic        Primary Care Provider " Office Phone # Fax #    Sydnee Mitchell MD PhD [phone]       [address]  [facility] [address]        Equal Access to Services     ORAL CUELLAR : Hadii aad ku hadmaria rsimran Cherylkellen, wamarilynda luqadaha, qaybta kaashlieda jolynn, emily zazuetajf moyerlexi lai pat rocha. So [facility] [phone].    ATENCIÓN: Si habla español, tiene a bauer disposición servicios gratuitos de asistencia lingüística. Llame al [phone].    We comply with applicable federal civil rights laws and [place] laws. We do not discriminate on the basis of race, color, national origin, age, disability, sex, sexual orientation, or gender identity.            Thank you!     Thank you for choosing [facility]  for your care. Our goal is always to provide you with excellent care. Hearing back from our patients is one way we can continue to improve our services. Please take a few minutes to complete the written survey that you may receive in the mail after your visit with us. Thank you!             Your Updated Medication List - Protect others around you: Learn how to safely use, store and throw away your medicines at www.disposemymeds.org.          This list is accurate as of 4/2/18  9:49 AM.  Always use your most recent med list.                   Brand Name Dispense Instructions for use Diagnosis    ASPIRIN PO      Take 81 mg by mouth        cyclobenzaprine 10 MG tablet    FLEXERIL          TYLENOL PO      Take 500 mg by mouth        zolpidem 5 MG tablet    AMBIEN    90 tablet    TAKE ONE TABLET BY MOUTH ONCE DAILY AT BEDTIME AS NEEDED    Chronic insomnia
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
## 2018-04-02 NOTE — NURSING NOTE
"Chief Complaint   Patient presents with     Pre-Op Exam       Initial /80 (BP Location: Right arm, Patient Position: Sitting, Cuff Size: Adult Large)  Pulse 81  Temp 96.2  F (35.7  C) (Temporal)  Ht 5' 10.67\" (1.795 m)  Wt 229 lb (103.9 kg)  SpO2 100%  BMI 32.24 kg/m2 Estimated body mass index is 32.24 kg/(m^2) as calculated from the following:    Height as of this encounter: 5' 10.67\" (1.795 m).    Weight as of this encounter: 229 lb (103.9 kg).  Medication Reconciliation: complete     Charlie Lubin MA  "

## 2018-04-02 NOTE — TELEPHONE ENCOUNTER
Per lab/Rossy patient is coming today for a  lab draw before his 9:10 preop appt with Dr. Mora.  Patient would like his CHANTEL labs drawn today that he would normally have done with Dr. Mitchell.   Patient hasn't had labs drawn since 2015 and  the following labs were drawn then:    Microalbumin quantitative  Prostate spec. Antigen  Lipid panel  TSH  CMP  Hgb A1C     I advised Rossy in the lab to send patient to be seen by Dr. Mora for his preop first and then he should go to the lab in case Dr. Mora needs any labs done for patients surgery.  Since patient is wanting his CHANTEL labs drawn today patient  needs to be advised that this is not an CHANTEL appt.  A pre op does not replace a yearly physical.     Are the above labs ok to draw for today?  Maybe add a CBC also?    Jania Winter CMA    Message routed to Dr. Mora to review.

## 2018-06-09 ENCOUNTER — THERAPY VISIT (OUTPATIENT)
Dept: PHYSICAL THERAPY | Facility: CLINIC | Age: 68
End: 2018-06-09
Payer: COMMERCIAL

## 2018-06-09 DIAGNOSIS — M54.12 LEFT CERVICAL RADICULOPATHY: ICD-10-CM

## 2018-06-09 DIAGNOSIS — M25.512 SHOULDER PAIN, LEFT: ICD-10-CM

## 2018-06-09 DIAGNOSIS — Z98.1 S/P CERVICAL SPINAL FUSION: Primary | ICD-10-CM

## 2018-06-09 PROCEDURE — 97110 THERAPEUTIC EXERCISES: CPT | Mod: GP | Performed by: PHYSICAL THERAPIST

## 2018-06-09 PROCEDURE — G8985 CARRY GOAL STATUS: HCPCS | Mod: GP | Performed by: PHYSICAL THERAPIST

## 2018-06-09 PROCEDURE — G8984 CARRY CURRENT STATUS: HCPCS | Mod: GP | Performed by: PHYSICAL THERAPIST

## 2018-06-09 PROCEDURE — 97161 PT EVAL LOW COMPLEX 20 MIN: CPT | Mod: GP | Performed by: PHYSICAL THERAPIST
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
## 2018-06-09 NOTE — MR AVS SNAPSHOT
After Visit Summary   6/9/2018    Madan Gatica    MRN: [id number]           Patient Information     Date Of Birth          1950        Visit Information        Provider Department      6/9/2018 9:20 AM Renetta Brar PT [facility]        Today's Diagnoses     S/P cervical spinal fusion    -  1    Left cervical radiculopathy        Shoulder pain, left           Follow-ups after your visit        Your next 10 appointments already scheduled     Jun 13, 2018 11:40 AM CDT   AYLA Spine with Joss De Santiago PT   [facility] ([facility])    [address]  [facility] [address]   [phone]            Jun 16, 2018 10:00 AM CDT   AYLA Spine with Renetta Brar, PT   [facility] ([facility])    [address]  [facility] [address]   [phone]            Jun 18, 2018  9:40 AM CDT   AYLA Spine with Joss De Santiago PT   [facility] ([facility])    [address]  [facility] [address]   [phone]            Jun 21, 2018  9:40 AM CDT   AYLA Spine with Joss De Santiago PT   [facility] ([facility])    [address]  [facility] [address]   [phone]            Jun 25, 2018 11:30 AM CDT   AYLA Spine with Una Banks PT   [facility] ([facility])    [address]  [facility] [address]   [phone]            Jun 28, 2018  9:40 AM CDT   AYLA Spine with Joss De Santiago PT   [facility] ([facility])    [address]  [facility] [address]   [phone]              Who to contact     If you have questions or need follow up information  about today's clinic visit or your schedule please contact [facility] directly at [phone].  Normal or non-critical lab and imaging results will be communicated to you by ChannelBreezehart, letter or phone within 4 business days after the clinic has received the results. If you do not hear from us within 7 days, please contact the clinic through ChannelBreezehart or phone. If you have a critical or abnormal lab result, we will notify you by phone as soon as possible.  Submit refill requests through Discera or call your pharmacy and they will forward the refill request to us. Please allow 3 business days for your refill to be completed.          Additional Information About Your Visit        Discera Information     Dis[employer]a gives you secure access to your electronic health record. If you see a primary care provider, you can also send messages to your care team and make appointments. If you have questions, please call your primary care clinic.  If you do not have a primary care provider, please call [phone] and they will assist you.        Care EveryWhere ID     This is your Care EveryWhere ID. This could be used by other organizations to access your [place] medical records  [id number]         Blood Pressure from Last 3 Encounters:   04/02/18 122/80   05/15/17 135/87   05/12/17 124/80    Weight from Last 3 Encounters:   04/02/18 103.9 kg (229 lb)   05/15/17 107 kg (236 lb)   05/12/17 106.6 kg (235 lb)              We Performed the Following     HC PT EVAL, LOW COMPLEXITY     AYLA INITIAL EVAL REPORT     THERAPEUTIC EXERCISES        Primary Care Provider Office Phone # Fax #    Sydnee Mitchell MD PhD [phone]       [address]  [facility] [address]        Equal Access to Services     ORAL CUELLAR : Alvin Vaughan, neno rosado, emily meyers. So [facility] [phone].    ATENCIÓN: Cordell ayala  español, tiene a bauer disposición servicios gratuitos de asistencia lingüística. Layo crump [phone].    We comply with applicable federal civil rights laws and [place] laws. We do not discriminate on the basis of race, color, national origin, age, disability, sex, sexual orientation, or gender identity.            Thank you!     Thank you for choosing [facility]  for your care. Our goal is always to provide you with excellent care. Hearing back from our patients is one way we can continue to improve our services. Please take a few minutes to complete the written survey that you may receive in the mail after your visit with us. Thank you!             Your Updated Medication List - Protect others around you: Learn how to safely use, store and throw away your medicines at www.disposemymeds.org.          This list is accurate as of 6/9/18 10:23 AM.  Always use your most recent med list.                   Brand Name Dispense Instructions for use Diagnosis    ASPIRIN PO      Take 81 mg by mouth        cyclobenzaprine 10 MG tablet    FLEXERIL          TYLENOL PO      Take 500 mg by mouth        zolpidem 5 MG tablet    AMBIEN    90 tablet    TAKE ONE TABLET BY MOUTH ONCE DAILY AT BEDTIME AS NEEDED    Chronic insomnia
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
## 2018-06-16 ENCOUNTER — THERAPY VISIT (OUTPATIENT)
Dept: PHYSICAL THERAPY | Facility: CLINIC | Age: 68
End: 2018-06-16
Payer: COMMERCIAL

## 2018-06-16 DIAGNOSIS — M25.512 ACUTE PAIN OF LEFT SHOULDER: ICD-10-CM

## 2018-06-16 DIAGNOSIS — Z98.1 S/P CERVICAL SPINAL FUSION: ICD-10-CM

## 2018-06-16 DIAGNOSIS — M54.12 LEFT CERVICAL RADICULOPATHY: ICD-10-CM

## 2018-06-16 PROCEDURE — 97140 MANUAL THERAPY 1/> REGIONS: CPT | Mod: GP | Performed by: PHYSICAL THERAPIST

## 2018-06-16 PROCEDURE — 97110 THERAPEUTIC EXERCISES: CPT | Mod: GP | Performed by: PHYSICAL THERAPIST

## 2018-06-16 PROCEDURE — 97112 NEUROMUSCULAR REEDUCATION: CPT | Mod: GP | Performed by: PHYSICAL THERAPIST

## 2018-06-18 ENCOUNTER — THERAPY VISIT (OUTPATIENT)
Dept: PHYSICAL THERAPY | Facility: CLINIC | Age: 68
End: 2018-06-18
Payer: COMMERCIAL

## 2018-06-18 DIAGNOSIS — M54.12 LEFT CERVICAL RADICULOPATHY: ICD-10-CM

## 2018-06-18 DIAGNOSIS — Z98.1 S/P CERVICAL SPINAL FUSION: ICD-10-CM

## 2018-06-18 DIAGNOSIS — M25.512 ACUTE PAIN OF LEFT SHOULDER: ICD-10-CM

## 2018-06-18 PROCEDURE — 97112 NEUROMUSCULAR REEDUCATION: CPT | Mod: GP

## 2018-06-18 PROCEDURE — 97110 THERAPEUTIC EXERCISES: CPT | Mod: GP

## 2018-06-27 ENCOUNTER — THERAPY VISIT (OUTPATIENT)
Dept: PHYSICAL THERAPY | Facility: CLINIC | Age: 68
End: 2018-06-27
Payer: COMMERCIAL

## 2018-06-27 DIAGNOSIS — Z98.1 S/P CERVICAL SPINAL FUSION: ICD-10-CM

## 2018-06-27 DIAGNOSIS — M54.12 LEFT CERVICAL RADICULOPATHY: ICD-10-CM

## 2018-06-27 DIAGNOSIS — M25.512 ACUTE PAIN OF LEFT SHOULDER: ICD-10-CM

## 2018-06-27 PROCEDURE — 97140 MANUAL THERAPY 1/> REGIONS: CPT | Mod: GP | Performed by: PHYSICAL THERAPIST

## 2018-06-27 PROCEDURE — 97112 NEUROMUSCULAR REEDUCATION: CPT | Mod: GP | Performed by: PHYSICAL THERAPIST

## 2018-06-27 PROCEDURE — 97530 THERAPEUTIC ACTIVITIES: CPT | Mod: GP | Performed by: PHYSICAL THERAPIST

## 2018-06-29 ENCOUNTER — THERAPY VISIT (OUTPATIENT)
Dept: PHYSICAL THERAPY | Facility: CLINIC | Age: 68
End: 2018-06-29
Payer: COMMERCIAL

## 2018-06-29 DIAGNOSIS — Z98.1 S/P CERVICAL SPINAL FUSION: ICD-10-CM

## 2018-06-29 DIAGNOSIS — M54.12 LEFT CERVICAL RADICULOPATHY: ICD-10-CM

## 2018-06-29 DIAGNOSIS — M25.512 ACUTE PAIN OF LEFT SHOULDER: ICD-10-CM

## 2018-06-29 PROCEDURE — 97140 MANUAL THERAPY 1/> REGIONS: CPT | Mod: GP | Performed by: PHYSICAL THERAPIST

## 2018-06-29 PROCEDURE — 97110 THERAPEUTIC EXERCISES: CPT | Mod: GP | Performed by: PHYSICAL THERAPIST

## 2018-07-03 ENCOUNTER — THERAPY VISIT (OUTPATIENT)
Dept: PHYSICAL THERAPY | Facility: CLINIC | Age: 68
End: 2018-07-03
Payer: COMMERCIAL

## 2018-07-03 DIAGNOSIS — M25.512 ACUTE PAIN OF LEFT SHOULDER: ICD-10-CM

## 2018-07-03 DIAGNOSIS — Z98.1 S/P CERVICAL SPINAL FUSION: ICD-10-CM

## 2018-07-03 DIAGNOSIS — M54.12 LEFT CERVICAL RADICULOPATHY: ICD-10-CM

## 2018-07-03 PROCEDURE — 97140 MANUAL THERAPY 1/> REGIONS: CPT | Mod: GP | Performed by: PHYSICAL THERAPIST

## 2018-07-03 PROCEDURE — 97110 THERAPEUTIC EXERCISES: CPT | Mod: GP | Performed by: PHYSICAL THERAPIST

## 2018-07-10 ENCOUNTER — THERAPY VISIT (OUTPATIENT)
Dept: PHYSICAL THERAPY | Facility: CLINIC | Age: 68
End: 2018-07-10
Payer: COMMERCIAL

## 2018-07-10 DIAGNOSIS — M54.12 LEFT CERVICAL RADICULOPATHY: ICD-10-CM

## 2018-07-10 DIAGNOSIS — Z98.1 S/P CERVICAL SPINAL FUSION: ICD-10-CM

## 2018-07-10 DIAGNOSIS — M25.512 ACUTE PAIN OF LEFT SHOULDER: ICD-10-CM

## 2018-07-10 PROCEDURE — 97110 THERAPEUTIC EXERCISES: CPT | Mod: GP | Performed by: PHYSICAL THERAPIST

## 2018-07-10 PROCEDURE — 97140 MANUAL THERAPY 1/> REGIONS: CPT | Mod: GP | Performed by: PHYSICAL THERAPIST

## 2018-07-12 ENCOUNTER — THERAPY VISIT (OUTPATIENT)
Dept: PHYSICAL THERAPY | Facility: CLINIC | Age: 68
End: 2018-07-12
Payer: COMMERCIAL

## 2018-07-12 DIAGNOSIS — M54.12 LEFT CERVICAL RADICULOPATHY: ICD-10-CM

## 2018-07-12 DIAGNOSIS — Z98.1 S/P CERVICAL SPINAL FUSION: ICD-10-CM

## 2018-07-12 DIAGNOSIS — M25.512 ACUTE PAIN OF LEFT SHOULDER: ICD-10-CM

## 2018-07-12 PROCEDURE — 97140 MANUAL THERAPY 1/> REGIONS: CPT | Mod: GP | Performed by: PHYSICAL THERAPIST

## 2018-07-12 PROCEDURE — 97110 THERAPEUTIC EXERCISES: CPT | Mod: GP | Performed by: PHYSICAL THERAPIST

## 2018-07-17 ENCOUNTER — THERAPY VISIT (OUTPATIENT)
Dept: PHYSICAL THERAPY | Facility: CLINIC | Age: 68
End: 2018-07-17
Payer: COMMERCIAL

## 2018-07-17 DIAGNOSIS — M54.12 LEFT CERVICAL RADICULOPATHY: ICD-10-CM

## 2018-07-17 DIAGNOSIS — Z98.1 S/P CERVICAL SPINAL FUSION: ICD-10-CM

## 2018-07-17 DIAGNOSIS — M25.512 ACUTE PAIN OF LEFT SHOULDER: ICD-10-CM

## 2018-07-17 PROCEDURE — 97140 MANUAL THERAPY 1/> REGIONS: CPT | Mod: GP | Performed by: PHYSICAL THERAPIST

## 2018-07-17 PROCEDURE — 97110 THERAPEUTIC EXERCISES: CPT | Mod: GP | Performed by: PHYSICAL THERAPIST

## 2018-07-19 ENCOUNTER — THERAPY VISIT (OUTPATIENT)
Dept: PHYSICAL THERAPY | Facility: CLINIC | Age: 68
End: 2018-07-19
Payer: COMMERCIAL

## 2018-07-19 DIAGNOSIS — Z98.1 S/P CERVICAL SPINAL FUSION: ICD-10-CM

## 2018-07-19 DIAGNOSIS — M54.12 LEFT CERVICAL RADICULOPATHY: ICD-10-CM

## 2018-07-19 DIAGNOSIS — M25.512 ACUTE PAIN OF LEFT SHOULDER: ICD-10-CM

## 2018-07-19 PROCEDURE — G8984 CARRY CURRENT STATUS: HCPCS | Mod: GP | Performed by: PHYSICAL THERAPIST

## 2018-07-19 PROCEDURE — 97140 MANUAL THERAPY 1/> REGIONS: CPT | Mod: GP | Performed by: PHYSICAL THERAPIST

## 2018-07-19 PROCEDURE — 97110 THERAPEUTIC EXERCISES: CPT | Mod: GP | Performed by: PHYSICAL THERAPIST

## 2018-07-19 PROCEDURE — G8985 CARRY GOAL STATUS: HCPCS | Mod: GP | Performed by: PHYSICAL THERAPIST

## 2018-07-19 NOTE — MR AVS SNAPSHOT
After Visit Summary   7/19/2018    Madan Gatica    MRN: [id number]           Patient Information     Date Of Birth          1950        Visit Information        Provider Department      7/19/2018 9:30 AM Chencho Garcia, PT [facility]        Today's Diagnoses     S/P cervical spinal fusion        Left cervical radiculopathy        Acute pain of left shoulder           Follow-ups after your visit        Your next 10 appointments already scheduled     Jul 27, 2018 10:20 AM CDT   AYLA Spine with Sheyla Grant PT   [facility] ([facility])    [address]  [facility] [address]   [phone]            Jul 31, 2018  9:00 AM CDT   AYLA Spine with Sheyla Grant PT   [facility] ([facility])    [address]  [facility] [address]   [phone]            Aug 01, 2018  3:30 PM CDT   Return Visit with Sydnee Mitchell MD PhD   [facility] ([facility]    [address] [place] [address] [phone]              Who to contact     If you have questions or need follow up information about today's clinic visit or your schedule please contact [facility] PHYSICAL THERAPY directly at [phone].  Normal or non-critical lab and imaging results will be communicated to you by MyChart, letter or phone within 4 business days after the clinic has received the results. If you do not hear from us within 7 days, please contact the clinic through MyChart or phone. If you have a critical or abnormal lab result, we will notify you by phone as soon as possible.  Submit refill requests through Roundscapes or call your pharmacy and they will forward the refill request to us. Please allow 3 business days for your refill to be completed.          Additional  Information About Your Visit        Grokkerhart Information     [employer] gives you secure access to your electronic health record. If you see a primary care provider, you can also send messages to your care team and make appointments. If you have questions, please call your primary care clinic.  If you do not have a primary care provider, please call [phone] and they will assist you.        Care EveryWhere ID     This is your Care EveryWhere ID. This could be used by other organizations to access your [place] medical records  [id number]         Blood Pressure from Last 3 Encounters:   04/02/18 122/80   05/15/17 135/87   05/12/17 124/80    Weight from Last 3 Encounters:   04/02/18 103.9 kg (229 lb)   05/15/17 107 kg (236 lb)   05/12/17 106.6 kg (235 lb)              We Performed the Following     AYLA PROGRESS NOTES REPORT     MANUAL THER TECH,1+REGIONS,EA 15 MIN     THERAPEUTIC EXERCISES        Primary Care Provider Office Phone # Fax #    Sydnee Mitchell MD PhD [phone]       [address]  [facility] [address]        Equal Access to Services     [facility]: Hadii aad ku hadashsimran Sokellen, waaxda luqadaha, qaybta kaalmawilliam neil, emily stewart . So [facility] [phone].    ATENCIÓN: Si habla español, tiene a bauer disposición servicios gratShowcaseos de asistencia lingüística. [facility] [phone].    We comply with applicable federal civil rights laws and [place] laws. We do not discriminate on the basis of race, color, national origin, age, disability, sex, sexual orientation, or gender identity.            Thank you!     Thank you for choosing [facility] PHYSICAL THERAP[facility]  for your care. Our goal is always to provide you with excellent care. Hearing back from our patients is one way we can continue to improve our services. Please take a few minutes to complete the written survey that you may receive in the mail after your visit with us.  Thank you!             Your Updated Medication List - Protect others around you: Learn how to safely use, store and throw away your medicines at www.disposemymeds.org.          This list is accurate as of 7/19/18 10:59 AM.  Always use your most recent med list.                   Brand Name Dispense Instructions for use Diagnosis    ASPIRIN PO      Take 81 mg by mouth        cyclobenzaprine 10 MG tablet    FLEXERIL          TYLENOL PO      Take 500 mg by mouth        zolpidem 5 MG tablet    AMBIEN    90 tablet    TAKE ONE TABLET BY MOUTH ONCE DAILY AT BEDTIME AS NEEDED    Chronic insomnia

## 2018-07-19 NOTE — PROGRESS NOTES
Subjective:  HPI                    Objective:  System              Cervical/Thoracic Evaluation  Cervical AROM: not assessed (collar use)                                  Shoulder Evaluation:  ROM:  AROM:    Flexion:  Left:  140        Abduction:  Left: 95                   Flexion/External Rotation:  Left:  C6      Extension/Internal Rotation:  Left:  T8        PROM:              External Rotation:  Left:  67                        Strength:    Flexion: Left:3/5   Pain:    Right: 5/5     Pain:     Abduction:  Left: 3-/5  Pain:    Right: 5/5     Pain:    Internal Rotation:  Left:4+/5     Pain:    Right: 5/5     Pain:  External Rotation:   Left:3/5     Pain:   Right:5/5     Pain:        Elbow Flexion:  Left:5-/5     Pain:    Right:5/5     Pain:  Elbow Extension:  Left:4+/5     Pain:    Right:5/5     Pain:                                           General     ROS    Assessment/Plan:    PROGRESS  REPORT    Progress reporting period is from 6/9/18 to 7/19/18.       SUBJECTIVE  Subjective changes noted by patient:  Mario is about three months s/p cervical fusion. Reports that pain is 80% resolved, L shoulder AROM is improving. Wearing collar roughly 23.5 hrs/day.  Has been consistent with his HEP.     Current pain level is 2/10  .     Previous pain level was  3/10  .   Changes in function:  Yes (See Goal flowsheet attached for changes in current functional level)  Adverse reaction to treatment or activity: None    OBJECTIVE  Changes noted in objective findings:  Yes, see physical exam        ASSESSMENT/PLAN  Updated problem list and treatment plan: Diagnosis 1:  S/p cervical fusion with L tavo  Pain -  manual therapy, self management, education, directional preference exercise and home program  Decreased ROM/flexibility - manual therapy, therapeutic exercise, therapeutic activity and home program  Decreased joint mobility - manual therapy, therapeutic exercise, therapeutic activity and home program  Decreased strength  - therapeutic exercise, therapeutic activities and home program  Inflammation - self management/home program  Impaired muscle performance - neuro re-education and home program  Decreased function - therapeutic activities and home program  Impaired posture - neuro re-education, therapeutic activities and home program  STG/LTGs have been met or progress has been made towards goals:  Yes (See Goal flow sheet completed today.)  Assessment of Progress: Patient is meeting short term goals and is progressing towards long term goals.  Self Management Plans:  Patient has been instructed in a home treatment program.  Patient  has been instructed in self management of symptoms.  I have re-evaluated this patient and find that the nature, scope, duration and intensity of the therapy is appropriate for the medical condition of the patient.  Madan continues to require the following intervention to meet STG and LTG's:  PT    Recommendations:  This patient would benefit from continued therapy.     Frequency:  1 X week, once daily  Duration:  for 8 weeks        Please refer to the daily flowsheet for treatment today, total treatment time and time spent performing 1:1 timed codes.

## 2018-07-19 NOTE — LETTER
[facility] ATHLETIC [facility] PHYSICAL [facility]  [address]  [place] [address]  [phone]    2018    Re: Madan Gatica   :   1950  MRN:  [id number]   REFERRING PHYSICIAN:   Pancho Christie    [facility]    Date of Initial Evaluation:  2018  Visits:  Rxs Used: 10  Reason for Referral:     S/P cervical spinal fusion  Left cervical radiculopathy  Acute pain of left shoulder       Cervical/Thoracic Evaluation  Cervical AROM: not assessed (collar use)          Shoulder Evaluation:  Flexion:  Left:  140      Abduction:  Left: 95     Flexion/External Rotation:  Left:  C6      Extension/Internal Rotation:  Left:  T8        External Rotation:  Left:  67       Flexion: Left:3/5   Pain:    Right: 5/5     Pain:   Abduction:  Left: 3-/5  Pain:    Right: 5/5     Pain:  Internal Rotation:  Left:4+/5     Pain:    Right: 5/5     Pain:  External Rotation:   Left:3/5     Pain:   Right:5/5     Pain:    Elbow Flexion:  Left:5-/5     Pain:    Right:5/5     Pain:  Elbow Extension:  Left:4+/5     Pain:    Right:5/5     Pain:     PROGRESS  REPORT    Progress reporting period is from 18 to 18.       SUBJECTIVE  Subjective changes noted by patient:  Mario is about three months s/p cervical fusion. Reports that pain is 80% resolved, L shoulder AROM is improving. Wearing collar roughly 23.5 hrs/day.  Has been consistent with his HEP.     Current pain level is 2/10  .     Previous pain level was  3/10  .   Changes in function:  Yes (See Goal flowsheet attached for changes in current functional level)  Adverse reaction to treatment or activity: None        Re: Madan Gatica   :   1950          OBJECTIVE  Changes noted in objective findings:  Yes, see physical exam      ASSESSMENT/PLAN  Updated problem list and treatment plan: Diagnosis 1:  S/p cervical fusion with L shouler  Pain -  manual therapy, self  management, education, directional preference exercise and home program  Decreased ROM/flexibility - manual therapy, therapeutic exercise, therapeutic activity and home program  Decreased joint mobility - manual therapy, therapeutic exercise, therapeutic activity and home program  Decreased strength - therapeutic exercise, therapeutic activities and home program  Inflammation - self management/home program  Impaired muscle performance - neuro re-education and home program  Decreased function - therapeutic activities and home program  Impaired posture - neuro re-education, therapeutic activities and home program  STG/LTGs have been met or progress has been made towards goals:  Yes (See Goal flow sheet completed today.)  Assessment of Progress: Patient is meeting short term goals and is progressing towards long term goals.  Self Management Plans:  Patient has been instructed in a home treatment program.  Patient  has been instructed in self management of symptoms.  I have re-evaluated this patient and find that the nature, scope, duration and intensity of the therapy is appropriate for the medical condition of the patient.  Madan continues to require the following intervention to meet STG and LTG's:  PT    Recommendations:  This patient would benefit from continued therapy.     Frequency:  1 X week, once daily  Duration:  for 8 weeks    Thank you for your referral.    INQUIRIES  Therapist: Chencho Garcia DPT   [facility] PHYSICAL THERAP[facility]  [address] [place] [address]. [address]  [facility] [address]  Phone: [phone]  Fax: [phone]

## 2018-07-27 ENCOUNTER — THERAPY VISIT (OUTPATIENT)
Dept: PHYSICAL THERAPY | Facility: CLINIC | Age: 68
End: 2018-07-27
Payer: COMMERCIAL

## 2018-07-27 DIAGNOSIS — Z98.1 S/P CERVICAL SPINAL FUSION: ICD-10-CM

## 2018-07-27 DIAGNOSIS — M25.512 ACUTE PAIN OF LEFT SHOULDER: ICD-10-CM

## 2018-07-27 DIAGNOSIS — M54.12 LEFT CERVICAL RADICULOPATHY: ICD-10-CM

## 2018-07-27 PROCEDURE — 97140 MANUAL THERAPY 1/> REGIONS: CPT | Mod: GP | Performed by: PHYSICAL THERAPIST

## 2018-07-27 PROCEDURE — 97110 THERAPEUTIC EXERCISES: CPT | Mod: GP | Performed by: PHYSICAL THERAPIST

## 2018-07-31 ENCOUNTER — THERAPY VISIT (OUTPATIENT)
Dept: PHYSICAL THERAPY | Facility: CLINIC | Age: 68
End: 2018-07-31
Payer: COMMERCIAL

## 2018-07-31 DIAGNOSIS — M25.512 ACUTE PAIN OF LEFT SHOULDER: ICD-10-CM

## 2018-07-31 DIAGNOSIS — Z98.1 S/P CERVICAL SPINAL FUSION: ICD-10-CM

## 2018-07-31 DIAGNOSIS — M54.12 LEFT CERVICAL RADICULOPATHY: ICD-10-CM

## 2018-07-31 PROCEDURE — 97140 MANUAL THERAPY 1/> REGIONS: CPT | Mod: GP | Performed by: PHYSICAL THERAPIST

## 2018-07-31 PROCEDURE — 97110 THERAPEUTIC EXERCISES: CPT | Mod: GP | Performed by: PHYSICAL THERAPIST

## 2018-08-01 ENCOUNTER — OFFICE VISIT (OUTPATIENT)
Dept: PEDIATRICS | Facility: CLINIC | Age: 68
End: 2018-08-01
Payer: COMMERCIAL

## 2018-08-01 VITALS
DIASTOLIC BLOOD PRESSURE: 80 MMHG | SYSTOLIC BLOOD PRESSURE: 126 MMHG | WEIGHT: 234 LBS | HEART RATE: 91 BPM | TEMPERATURE: 97.2 F | OXYGEN SATURATION: 99 % | BODY MASS INDEX: 32.94 KG/M2

## 2018-08-01 DIAGNOSIS — Z12.11 SPECIAL SCREENING FOR MALIGNANT NEOPLASMS, COLON: ICD-10-CM

## 2018-08-01 DIAGNOSIS — F51.04 CHRONIC INSOMNIA: Primary | ICD-10-CM

## 2018-08-01 PROCEDURE — 99213 OFFICE O/P EST LOW 20 MIN: CPT | Performed by: INTERNAL MEDICINE

## 2018-08-01 RX ORDER — ZOLPIDEM TARTRATE 5 MG/1
TABLET ORAL
Qty: 90 TABLET | Refills: 1 | Status: SHIPPED | OUTPATIENT
Start: 2018-08-01 | End: 2018-09-27

## 2018-08-01 NOTE — PATIENT INSTRUCTIONS
Make appointment(s) for:   -- colonoscopy.     Check with your insurance regarding coverage for Shingrix (RZV) - the new shingles vaccine.         Medication(s) prescribed today:    Orders Placed This Encounter   Medications     zolpidem (AMBIEN) 5 MG tablet     Sig: TAKE ONE TABLET BY MOUTH ONCE DAILY AT BEDTIME AS NEEDED     Dispense:  90 tablet     Refill:  1

## 2018-08-01 NOTE — MR AVS SNAPSHOT
After Visit Summary   8/1/2018    Madan Gatica    MRN: [id number]           Patient Information     Date Of Birth          1950        Visit Information        Provider Department      8/1/2018 3:30 PM Sydnee Mitchell MD PhD [facility]        Today's Diagnoses     Chronic insomnia    -  1    Special screening for malignant neoplasms, colon          Care Instructions    Make appointment(s) for:   -- colonoscopy.     Check with your insurance regarding coverage for Shingrix (RZV) - the new shingles vaccine.         Medication(s) prescribed today:    Orders Placed This Encounter   Medications     zolpidem (AMBIEN) 5 MG tablet     Sig: TAKE ONE TABLET BY MOUTH ONCE DAILY AT BEDTIME AS NEEDED     Dispense:  90 tablet     Refill:  1                   Follow-ups after your visit        Additional Services     GASTROENTEROLOGY ADULT REF PROCEDURE ONLY [facility] [phone]       Last Lab Result: Creatinine (mg/dL)       Date                     Value                 04/02/2018               0.94             ----------  Body mass index is 32.94 kg/(m^2).     Needed:  No  Language:  English    Patient will be contacted to schedule procedure.     Please be aware that coverage of these services is subject to the terms and limitations of your health insurance plan.  Call member services at your health plan with any benefit or coverage questions.  Any procedures must be performed at a [facility] facility OR coordinated by your clinic's referral office.    Please bring the following with you to your appointment:    (1) Any X-Rays, CTs or MRIs which have been performed.  Contact the facility where they were done to arrange for  prior to your scheduled appointment.    (2) List of current medications   (3) This referral request   (4) Any documents/labs given to you for this referral                  Follow-up notes from your care team     Return for Wellness.      Your next 10  appointments already scheduled     Aug 14, 2018  8:20 AM CDT   AYLA Spine with Sheyla Grant PT   [facility] Therapy ([facility])    [address]  [facility] [id number] [phone]            Aug 17, 2018  9:00 AM CDT   AYLA Spine with Sheyla Grant PT   [facility] Therapy ([facility])    [address]  [facility] [id number] [phone]              Who to contact     If you have questions or need follow up information about today's clinic visit or your schedule please contact [facility] directly at [phone].  Normal or non-critical lab and imaging results will be communicated to you by Seevibeshart, letter or phone within 4 business days after the clinic has received the results. If you do not hear from us within 7 days, please contact the clinic through Seevibeshart or phone. If you have a critical or abnormal lab result, we will notify you by phone as soon as possible.  Submit refill requests through NoFlo or call your pharmacy and they will forward the refill request to us. Please allow 3 business days for your refill to be completed.          Additional Information About Your Visit        Aktivitot Information     [employer] gives you secure access to your electronic health record. If you see a primary care provider, you can also send messages to your care team and make appointments. If you have questions, please call your primary care clinic.  If you do not have a primary care provider, please call [phone] and they will assist you.      NoFlo is an electronic gateway that provides easy, online access to your medical records. With NoFlo, you can request a clinic appointment, read your test results, renew a prescription or communicate with your care team.     To access your existing account, please contact your [facility] or call  [phone] for assistance.        Care EveryWhere ID     This is your Care EveryWhere ID. This could be used by other organizations to access your [place] medical records  [id number]        Your Vitals Were     Pulse Temperature Pulse Oximetry BMI (Body Mass Index)          91 97.2  F (36.2  C) (Temporal) 99% 32.94 kg/m2         Blood Pressure from Last 3 Encounters:   08/01/18 126/80   04/02/18 122/80   05/15/17 135/87    Weight from Last 3 Encounters:   08/01/18 234 lb (106.1 kg)   04/02/18 229 lb (103.9 kg)   05/15/17 236 lb (107 kg)              We Performed the Following     GASTROENTEROLOGY ADULT REF PROCEDURE ONLY [facility] [phone]          Where to get your medicines      Some of these will need a paper prescription and others can be bought over the counter.  Ask your nurse if you have questions.     Bring a paper prescription for each of these medications     zolpidem 5 MG tablet          Primary Care Provider Office Phone # Fax #    Sydnee Mitchell MD PhD [phone]       [address]  [facility] [address]        Equal Access to Services     [facility] : Hadii aad ku hadasho Soomaali, waaxda luqadaha, qaybta kaalmada adeegyada, emily rocha. So [facility] [phone].    ATENCIÓN: Si habla español, tiene a bauer disposición servicios gratuitos de asistencia lingüística. Llame al [phone].    We comply with applicable federal civil rights laws and [place] laws. We do not discriminate on the basis of race, color, national origin, age, disability, sex, sexual orientation, or gender identity.            Thank you!     Thank you for choosing [facility]  for your care. Our goal is always to provide you with excellent care. Hearing back from our patients is one way we can continue to improve our services. Please take a few minutes to complete the written survey that you may receive in the mail after your visit with us. Thank you!              Your Updated Medication List - Protect others around you: Learn how to safely use, store and throw away your medicines at www.disposemymeds.org.          This list is accurate as of 8/1/18  4:29 PM.  Always use your most recent med list.                   Brand Name Dispense Instructions for use Diagnosis    ASPIRIN PO      Take 81 mg by mouth        cyclobenzaprine 10 MG tablet    FLEXERIL          TYLENOL PO      Take 500 mg by mouth        zolpidem 5 MG tablet    AMBIEN    90 tablet    TAKE ONE TABLET BY MOUTH ONCE DAILY AT BEDTIME AS NEEDED    Chronic insomnia

## 2018-08-01 NOTE — PROGRESS NOTES
SUBJECTIVE:   Madan Gatica is a 67 year old male who presents to clinic today for the following health issues:      Medication Followup of all meds, Going back work this week after neck surgery in Apr 2018.     Taking Medication as prescribed: yes    Side Effects:  None    Medication Helping Symptoms:  yes     Patient had cervical spine fusion at [facility] in April. Has finished rehab. Had the C collar taken off last week. 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 He is planning to move to [place] in November.     He needs refill of Ambien. He is not taking any other prescription medications. No pain meds.     He doesn't want to address preventive issues other than getting the colonoscopy done before he moves.     ROS:  Constitutional, HEENT, cardiovascular, pulmonary, gi and gu systems are negative, except as otherwise noted.         Current Outpatient Prescriptions on File Prior to Visit:  ASPIRIN PO Take 81 mg by mouth   cyclobenzaprine (FLEXERIL) 10 MG tablet    Acetaminophen (TYLENOL PO) Take 500 mg by mouth     No current facility-administered medications on file prior to visit.        Patient Active Problem List   Diagnosis     Chronic insomnia     Hyperlipidemia LDL goal <100     Chronic neck pain     Family history of hemochromatosis     Family history of bicuspid aortic valve     Alcohol dependence, daily use (H)     Fatty liver, alcoholic     S/P cervical spinal fusion     Left cervical radiculopathy     Shoulder pain, left     Past Surgical History:   Procedure Laterality Date     LASIK BILATERAL  2004     REMOVE HARDWARE ARTHROPLASTY KNEE, IRRIG & ROMEO, PLACE ANTIBIOTIC CEMENT SPACER, COMBINED Right 2008       Social History   Substance Use Topics     Smoking status: Former Smoker     Packs/day: 1.00     Years: 4.00     Types: Cigarettes     Smokeless tobacco: Never Used     Alcohol use 12.6 oz/week     21 Standard drinks or equivalent per week      Comment: 3 beer a day      Family History   Problem Relation Age of Onset     HEART DISEASE Brother      bicuspid aortic valve     Liver Disease Brother      hemachromatosis             Problem list, Medication list, Allergies, and Medical/Social/Surgical histories reviewed in [facility] and updated as appropriate.    OBJECTIVE:                                                    /80  Pulse 91  Temp 97.2  F (36.2  C) (Temporal)  Wt 234 lb (106.1 kg)  SpO2 99%  BMI 32.94 kg/m2    GENERAL: healthy, alert and no distress            ASSESSMENT/PLAN:                                                      67 year old male with the following diagnoses and treatment plan:      ICD-10-CM    1. Chronic insomnia F51.04 zolpidem (AMBIEN) 5 MG tablet   2. Special screening for malignant neoplasms, colon Z12.11 GASTROENTEROLOGY ADULT REF PROCEDURE ONLY [facility] [phone]       -- refilled Ambien for 6 months.Colonoscopy ordered.   -- no return visit schedule.     Will call or return to clinic if worsening or symptoms not improving as discussed.  See Patient Instructions.      Sydnee Mitchell MD-PhD  [facility]    (Note: Chart documentation was done in part with Dragon Voice Recognition software. Although reviewed after completion, some word and grammatical errors may remain.)

## 2018-08-03 ENCOUNTER — THERAPY VISIT (OUTPATIENT)
Dept: PHYSICAL THERAPY | Facility: CLINIC | Age: 68
End: 2018-08-03
Payer: COMMERCIAL

## 2018-08-03 DIAGNOSIS — Z98.1 S/P CERVICAL SPINAL FUSION: ICD-10-CM

## 2018-08-03 DIAGNOSIS — M54.12 LEFT CERVICAL RADICULOPATHY: ICD-10-CM

## 2018-08-03 DIAGNOSIS — M25.512 ACUTE PAIN OF LEFT SHOULDER: ICD-10-CM

## 2018-08-03 PROCEDURE — 97110 THERAPEUTIC EXERCISES: CPT | Mod: GP | Performed by: PHYSICAL THERAPIST

## 2018-08-03 PROCEDURE — 97530 THERAPEUTIC ACTIVITIES: CPT | Mod: GP | Performed by: PHYSICAL THERAPIST

## 2018-08-03 PROCEDURE — 97140 MANUAL THERAPY 1/> REGIONS: CPT | Mod: GP | Performed by: PHYSICAL THERAPIST

## 2018-08-14 ENCOUNTER — THERAPY VISIT (OUTPATIENT)
Dept: PHYSICAL THERAPY | Facility: CLINIC | Age: 68
End: 2018-08-14
Payer: COMMERCIAL

## 2018-08-14 DIAGNOSIS — Z98.1 S/P CERVICAL SPINAL FUSION: ICD-10-CM

## 2018-08-14 DIAGNOSIS — M25.512 ACUTE PAIN OF LEFT SHOULDER: ICD-10-CM

## 2018-08-14 DIAGNOSIS — M54.12 LEFT CERVICAL RADICULOPATHY: ICD-10-CM

## 2018-08-14 PROCEDURE — 97110 THERAPEUTIC EXERCISES: CPT | Mod: GP | Performed by: PHYSICAL THERAPIST

## 2018-08-14 PROCEDURE — 97140 MANUAL THERAPY 1/> REGIONS: CPT | Mod: GP | Performed by: PHYSICAL THERAPIST

## 2018-08-17 ENCOUNTER — THERAPY VISIT (OUTPATIENT)
Dept: PHYSICAL THERAPY | Facility: CLINIC | Age: 68
End: 2018-08-17
Payer: COMMERCIAL

## 2018-08-17 DIAGNOSIS — Z98.1 S/P CERVICAL SPINAL FUSION: ICD-10-CM

## 2018-08-17 DIAGNOSIS — M25.512 ACUTE PAIN OF LEFT SHOULDER: ICD-10-CM

## 2018-08-17 DIAGNOSIS — M54.12 LEFT CERVICAL RADICULOPATHY: ICD-10-CM

## 2018-08-17 PROCEDURE — 97110 THERAPEUTIC EXERCISES: CPT | Mod: GP | Performed by: PHYSICAL THERAPIST

## 2018-08-17 PROCEDURE — 97140 MANUAL THERAPY 1/> REGIONS: CPT | Mod: GP | Performed by: PHYSICAL THERAPIST

## 2018-08-22 ENCOUNTER — HOSPITAL ENCOUNTER (OUTPATIENT)
Facility: AMBULATORY SURGERY CENTER | Age: 68
Discharge: HOME | End: 2018-08-22
Attending: INTERNAL MEDICINE | Admitting: INTERNAL MEDICINE
Payer: COMMERCIAL

## 2018-08-22 ENCOUNTER — SURGERY (OUTPATIENT)
Age: 68
End: 2018-08-22

## 2018-08-22 VITALS
OXYGEN SATURATION: 98 % | WEIGHT: 225 LBS | TEMPERATURE: 97.1 F | HEIGHT: 71 IN | SYSTOLIC BLOOD PRESSURE: 124 MMHG | DIASTOLIC BLOOD PRESSURE: 67 MMHG | BODY MASS INDEX: 31.5 KG/M2 | RESPIRATION RATE: 16 BRPM

## 2018-08-22 LAB — COLONOSCOPY: NORMAL

## 2018-08-22 PROCEDURE — G0121 COLON CA SCRN NOT HI RSK IND: HCPCS

## 2018-08-22 PROCEDURE — G8918 PT W/O PREOP ORDER IV AB PRO: HCPCS

## 2018-08-22 PROCEDURE — G8907 PT DOC NO EVENTS ON DISCHARG: HCPCS

## 2018-08-22 RX ORDER — ONDANSETRON 2 MG/ML
4 INJECTION INTRAMUSCULAR; INTRAVENOUS
Status: DISCONTINUED | OUTPATIENT
Start: 2018-08-22 | End: 2018-08-23 | Stop reason: HOSPADM

## 2018-08-22 RX ORDER — LIDOCAINE 40 MG/G
CREAM TOPICAL
Status: DISCONTINUED | OUTPATIENT
Start: 2018-08-22 | End: 2018-08-23 | Stop reason: HOSPADM

## 2018-08-22 RX ORDER — FENTANYL CITRATE 50 UG/ML
INJECTION, SOLUTION INTRAMUSCULAR; INTRAVENOUS PRN
Status: DISCONTINUED | OUTPATIENT
Start: 2018-08-22 | End: 2018-08-22 | Stop reason: HOSPADM

## 2018-08-22 RX ADMIN — FENTANYL CITRATE 100 MCG: 50 INJECTION, SOLUTION INTRAMUSCULAR; INTRAVENOUS at 08:57

## 2018-08-24 ENCOUNTER — THERAPY VISIT (OUTPATIENT)
Dept: PHYSICAL THERAPY | Facility: CLINIC | Age: 68
End: 2018-08-24
Payer: COMMERCIAL

## 2018-08-24 DIAGNOSIS — M25.512 ACUTE PAIN OF LEFT SHOULDER: ICD-10-CM

## 2018-08-24 DIAGNOSIS — M54.12 LEFT CERVICAL RADICULOPATHY: ICD-10-CM

## 2018-08-24 DIAGNOSIS — Z98.1 S/P CERVICAL SPINAL FUSION: ICD-10-CM

## 2018-08-24 PROCEDURE — 97110 THERAPEUTIC EXERCISES: CPT | Mod: GP | Performed by: PHYSICAL THERAPIST

## 2018-08-24 PROCEDURE — 97112 NEUROMUSCULAR REEDUCATION: CPT | Mod: GP | Performed by: PHYSICAL THERAPIST

## 2018-08-24 PROCEDURE — 97140 MANUAL THERAPY 1/> REGIONS: CPT | Mod: GP | Performed by: PHYSICAL THERAPIST

## 2018-08-31 ENCOUNTER — THERAPY VISIT (OUTPATIENT)
Dept: PHYSICAL THERAPY | Facility: CLINIC | Age: 68
End: 2018-08-31
Payer: COMMERCIAL

## 2018-08-31 DIAGNOSIS — M25.512 ACUTE PAIN OF LEFT SHOULDER: ICD-10-CM

## 2018-08-31 DIAGNOSIS — Z98.1 S/P CERVICAL SPINAL FUSION: ICD-10-CM

## 2018-08-31 DIAGNOSIS — M54.12 LEFT CERVICAL RADICULOPATHY: ICD-10-CM

## 2018-08-31 PROCEDURE — 97110 THERAPEUTIC EXERCISES: CPT | Mod: GP | Performed by: PHYSICAL THERAPIST

## 2018-08-31 PROCEDURE — 97140 MANUAL THERAPY 1/> REGIONS: CPT | Mod: GP | Performed by: PHYSICAL THERAPIST

## 2018-08-31 PROCEDURE — 97112 NEUROMUSCULAR REEDUCATION: CPT | Mod: GP | Performed by: PHYSICAL THERAPIST

## 2018-09-07 ENCOUNTER — THERAPY VISIT (OUTPATIENT)
Dept: PHYSICAL THERAPY | Facility: CLINIC | Age: 68
End: 2018-09-07
Payer: COMMERCIAL

## 2018-09-07 DIAGNOSIS — Z98.1 S/P CERVICAL SPINAL FUSION: ICD-10-CM

## 2018-09-07 DIAGNOSIS — M25.512 ACUTE PAIN OF LEFT SHOULDER: ICD-10-CM

## 2018-09-07 DIAGNOSIS — M54.12 LEFT CERVICAL RADICULOPATHY: ICD-10-CM

## 2018-09-07 PROCEDURE — 97140 MANUAL THERAPY 1/> REGIONS: CPT | Mod: GP | Performed by: PHYSICAL THERAPIST

## 2018-09-07 PROCEDURE — 97110 THERAPEUTIC EXERCISES: CPT | Mod: GP | Performed by: PHYSICAL THERAPIST

## 2018-09-14 ENCOUNTER — THERAPY VISIT (OUTPATIENT)
Dept: PHYSICAL THERAPY | Facility: CLINIC | Age: 68
End: 2018-09-14
Payer: COMMERCIAL

## 2018-09-14 DIAGNOSIS — M54.12 LEFT CERVICAL RADICULOPATHY: ICD-10-CM

## 2018-09-14 DIAGNOSIS — Z98.1 S/P CERVICAL SPINAL FUSION: ICD-10-CM

## 2018-09-14 DIAGNOSIS — M25.512 ACUTE PAIN OF LEFT SHOULDER: ICD-10-CM

## 2018-09-14 PROCEDURE — 97140 MANUAL THERAPY 1/> REGIONS: CPT | Mod: GP | Performed by: PHYSICAL THERAPIST

## 2018-09-14 PROCEDURE — 97112 NEUROMUSCULAR REEDUCATION: CPT | Mod: GP | Performed by: PHYSICAL THERAPIST

## 2018-09-14 PROCEDURE — 97110 THERAPEUTIC EXERCISES: CPT | Mod: GP | Performed by: PHYSICAL THERAPIST

## 2018-09-24 DIAGNOSIS — F51.04 CHRONIC INSOMNIA: ICD-10-CM

## 2018-09-24 RX ORDER — ZOLPIDEM TARTRATE 5 MG/1
TABLET ORAL
Qty: 90 TABLET | Refills: 0 | OUTPATIENT
Start: 2018-09-24

## 2018-09-24 NOTE — TELEPHONE ENCOUNTER
zolpidem (AMBIEN) 5 MG tablet 90 tablet 1 8/1/2018  No   Sig: TAKE ONE TABLET BY MOUTH ONCE DAILY AT BEDTIME AS NEEDED     Pharmacy   [facility] [address]     Our records indicate duplicate request. Same requesting pharmacy. Romelia Barron RN

## 2018-09-27 RX ORDER — ZOLPIDEM TARTRATE 5 MG/1
TABLET ORAL
Qty: 90 TABLET | Refills: 1 | Status: SHIPPED | OUTPATIENT
Start: 2018-09-27

## 2018-09-27 NOTE — TELEPHONE ENCOUNTER
Called patient, he stated that he misplaced the rx, wasn't ware he needed to take it to the pharmacy. It was given to him at his appointment on 8-1-18. Are we able to reprint the rx and fax?  Brenda HARVEYA

## 2018-09-27 NOTE — TELEPHONE ENCOUNTER
Health Call Center    Phone Message    May a detailed message be left on voicemail: yes    Reason for Call: Medication Refill Request    Has the patient contacted the pharmacy for the refill? Yes   Name of medication being requested: Zolpidem  Provider who prescribed the medication: Dr. Mitchell  Pharmacy: [facility] in [place]  Date medication is needed: ASAP--patient states he requested this and pharmacy requested and they still have not heard from Dr. Mitchell.  Please advise.           Action Taken: Message routed to:  Primary Care p [phone]

## 2018-10-12 ENCOUNTER — THERAPY VISIT (OUTPATIENT)
Dept: PHYSICAL THERAPY | Facility: CLINIC | Age: 68
End: 2018-10-12
Payer: COMMERCIAL

## 2018-10-12 DIAGNOSIS — M54.12 LEFT CERVICAL RADICULOPATHY: ICD-10-CM

## 2018-10-12 DIAGNOSIS — Z98.1 S/P CERVICAL SPINAL FUSION: ICD-10-CM

## 2018-10-12 DIAGNOSIS — M25.512 ACUTE PAIN OF LEFT SHOULDER: ICD-10-CM

## 2018-10-12 PROCEDURE — 97110 THERAPEUTIC EXERCISES: CPT | Mod: GP | Performed by: PHYSICAL THERAPIST

## 2018-10-12 PROCEDURE — 97140 MANUAL THERAPY 1/> REGIONS: CPT | Mod: GP | Performed by: PHYSICAL THERAPIST

## 2018-10-12 NOTE — MR AVS SNAPSHOT
After Visit Summary   10/12/2018    Madan Gatica    MRN: [id number]           Patient Information     Date Of Birth          1950        Visit Information        Provider Department      10/12/2018 8:20 AM Sheyla Grant PT [facility] Physical Therapy        Today's Diagnoses     S/P cervical spinal fusion        Left cervical radiculopathy        Acute pain of left shoulder           Follow-ups after your visit        Who to contact     If you have questions or need follow up information about today's clinic visit or your schedule please contact [facility] PHYSICAL THERAPY directly at [phone].  Normal or non-critical lab and imaging results will be communicated to you by HRsofthart, letter or phone within 4 business days after the clinic has received the results. If you do not hear from us within 7 days, please contact the clinic through HRsofthart or phone. If you have a critical or abnormal lab result, we will notify you by phone as soon as possible.  Submit refill requests through Ziptask or call your pharmacy and they will forward the refill request to us. Please allow 3 business days for your refill to be completed.          Additional Information About Your Visit        MyChart Information     Ziptask gives you secure access to your electronic health record. If you see a primary care provider, you can also send messages to your care team and make appointments. If you have questions, please call your primary care clinic.  If you do not have a primary care provider, please call [phone] and they will assist you.        Care EveryWhere ID     This is your Care EveryWhere ID. This could be used by other organizations to access your [facility] medical records  [id number]         Blood Pressure from Last 3 Encounters:   08/22/18 124/67   08/01/18 126/80   04/02/18 122/80    Weight from Last 3 Encounters:   08/15/18 102.1 kg  (225 lb)   08/01/18 106.1 kg (234 lb)   04/02/18 103.9 kg (229 lb)              We Performed the Following     AYLA PROGRESS NOTES REPORT     MANUAL THER TECH,1+REGIONS,EA 15 MIN     THERAPEUTIC EXERCISES        Primary Care Provider Office Phone # Fax #    Sydnee Mitchell MD PhD [phone]       [address]  [facility] [address]        Equal Access to Services     [facility] : Hadii aad ku hadasho Soomaali, waaxda luqadaha, qaybta kaalmada adeegyada, waxay idiin hayaan adeeg kharash la'aan . So [facility] [phone].    ATENCIÓN: Si jamie spain, tiene a bauer disposición servicios gratuitos de asistencia lingüística. Layo al [phone].    We comply with applicable federal civil rights laws and [place] laws. We do not discriminate on the basis of race, color, national origin, age, disability, sex, sexual orientation, or gender identity.            Thank you!     Thank you for choosing [facility] PHYSICAL THERAP[facility]  for your care. Our goal is always to provide you with excellent care. Hearing back from our patients is one way we can continue to improve our services. Please take a few minutes to complete the written survey that you may receive in the mail after your visit with us. Thank you!             Your Updated Medication List - Protect others around you: Learn how to safely use, store and throw away your medicines at www.disposemymeds.org.          This list is accurate as of 10/12/18  9:03 AM.  Always use your most recent med list.                   Brand Name Dispense Instructions for use Diagnosis    ASPIRIN PO      Take 81 mg by mouth        cyclobenzaprine 10 MG tablet    FLEXERIL          TYLENOL PO      Take 500 mg by mouth        zolpidem 5 MG tablet    AMBIEN    90 tablet    TAKE ONE TABLET BY MOUTH ONCE DAILY AT BEDTIME AS NEEDED    Chronic insomnia

## 2018-10-12 NOTE — PROGRESS NOTES
Subjective:  HPI                    Objective:  System    Physical Exam    General     ROS    Assessment/Plan:    DISCHARGE REPORT    Progress reporting period is from 6/9/18 to 10/12/18.       SUBJECTIVE  Subjective: Mario reports that he has been busy moving and packing lightweight items in prep [place] [facility]  Shoulder and neck motion improving slightly.  Loss of sleep recently but feels that is due to busy brain and lots to think about.  Knot continues in L UT.      Current Pain level: 3/10.     Previous pain level was  1-2/10.  Initial Pain level: 6/10.   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     OBJECTIVE  Changes noted in objective findings:  Yes, please see below.   Objective: L shoulder AROM: flex 145, abd 158, ER 30, ext 48, IR/Ext T7.5.  L shoulder strength: flex and abd 5-/5, ext, add, IR, Ext 5/5.  Cervical AROM: min loss flexion, mod-major loss extension, B SB mod loss, B rotation mod loss.  Increase in L UT and posterior neck pain with extension.  Mod TTP to L UT/levator.  D/t recent stress with moving back to [place] and increased workload at home and work,pt has been having increased pain and present with slight decline in shoulder ROM.  Anticipate that following move and getting settled sx will return to previous level.  Instuct for ice/heat and continues motion (daily) and strength (3x/week) exercises.      ASSESSMENT/PLAN  Updated problem list and treatment plan: Diagnosis 1:  Neck pain, L shoulder pain, s/p cervical fusion.   Pain -  home program  Decreased ROM/flexibility - home program  Decreased joint mobility - home program  Decreased strength - home program  Decreased function - home program  STG/LTGs have been met or progress has been made towards goals:  Yes (See Goal flow sheet completed  today.)  Assessment of Progress: Patient is meeting short term goals and is progressing towards long term goals.  Self Management Plans:  Patient has been instructed in a home treatment program.  Patient  has been instructed in self management of symptoms.  I have re-evaluated this patient and find that the nature, scope, duration and intensity of the therapy is appropriate for the medical condition of the patient.  Madan continues to require the following intervention to meet STG and LTG's:  PT intervention is no longer required to meet STG/LTG.    Recommendations:  This patient is ready to be discharged from therapy and continue their home treatment program.    Please refer to the daily flowsheet for treatment today, total treatment time and time spent performing 1:1 timed codes.

## 2019-01-29 ENCOUNTER — TELEPHONE (OUTPATIENT)
Dept: PEDIATRICS | Facility: CLINIC | Age: 69
End: 2019-01-29

## 2019-02-01 NOTE — TELEPHONE ENCOUNTER
Prior Authorization Approval    Authorization Effective Date: 2/1/2019  Authorization Expiration Date: 2/1/2021  Medication: Ambien 5 mg-PA APPROVED  Approved Dose/Quantity:   Reference #: [id number]   Insurance Company: [employer] [phone] Fax [phone]  Expected CoPay:       CoPay Card Available:      Foundation Assistance Needed:    Which Pharmacy is filling the prescription (Not needed for infusion/clinic administered): [facility] [address] [place] [address] [address]  Pharmacy Notified: Yes  Patient Notified: No-Pharmacy will notify patient when ready.     Called [facility] in [place] script was transferred to. Patient will get a text when medication is ready for .

## 2019-02-01 NOTE — TELEPHONE ENCOUNTER
Central Prior Authorization Team   Phone: [phone]      PA Initiation    Medication: Ambien 5 mg-PA APPROVED  Insurance Company: [employer] - Phone [phone] Fax [phone]  Pharmacy Filling the Rx: [facility] [address] [place] [address] [address]  Filling Pharmacy Phone: [phone]  Filling Pharmacy Fax: [phone]  Start Date: 2/1/2019

## 2019-11-05 ENCOUNTER — HEALTH MAINTENANCE LETTER (OUTPATIENT)
Age: 69
End: 2019-11-05

## 2020-02-16 ENCOUNTER — HEALTH MAINTENANCE LETTER (OUTPATIENT)
Age: 70
End: 2020-02-16

## 2020-11-22 ENCOUNTER — HEALTH MAINTENANCE LETTER (OUTPATIENT)
Age: 70
End: 2020-11-22

## 2021-04-04 ENCOUNTER — HEALTH MAINTENANCE LETTER (OUTPATIENT)
Age: 71
End: 2021-04-04

## 2021-09-19 ENCOUNTER — HEALTH MAINTENANCE LETTER (OUTPATIENT)
Age: 71
End: 2021-09-19

## 2022-04-30 ENCOUNTER — HEALTH MAINTENANCE LETTER (OUTPATIENT)
Age: 72
End: 2022-04-30

## 2022-11-20 ENCOUNTER — HEALTH MAINTENANCE LETTER (OUTPATIENT)
Age: 72
End: 2022-11-20

## 2023-06-02 ENCOUNTER — HEALTH MAINTENANCE LETTER (OUTPATIENT)
Age: 73
End: 2023-06-02

## (undated) DEVICE — SOL WATER IRRIG 1000ML BOTTLE 07139-09

## (undated) RX ORDER — SIMETHICONE 40MG/0.6ML
SUSPENSION, DROPS(FINAL DOSAGE FORM)(ML) ORAL
Status: DISPENSED
Start: 2018-08-22

## (undated) RX ORDER — FENTANYL CITRATE 50 UG/ML
INJECTION, SOLUTION INTRAMUSCULAR; INTRAVENOUS
Status: DISPENSED
Start: 2018-08-22